# Patient Record
Sex: MALE | Race: WHITE | Employment: OTHER | URBAN - METROPOLITAN AREA
[De-identification: names, ages, dates, MRNs, and addresses within clinical notes are randomized per-mention and may not be internally consistent; named-entity substitution may affect disease eponyms.]

---

## 2019-05-11 ENCOUNTER — APPOINTMENT (OUTPATIENT)
Dept: CT IMAGING | Age: 49
End: 2019-05-11
Payer: MEDICARE

## 2019-05-11 ENCOUNTER — HOSPITAL ENCOUNTER (EMERGENCY)
Age: 49
Discharge: NURSING, REHAB OR LONG-TERM CARE | End: 2019-05-11
Attending: EMERGENCY MEDICINE
Payer: MEDICARE

## 2019-05-11 VITALS
WEIGHT: 212 LBS | HEIGHT: 71 IN | DIASTOLIC BLOOD PRESSURE: 79 MMHG | SYSTOLIC BLOOD PRESSURE: 140 MMHG | TEMPERATURE: 98.1 F | OXYGEN SATURATION: 98 % | HEART RATE: 68 BPM | RESPIRATION RATE: 18 BRPM | BODY MASS INDEX: 29.68 KG/M2

## 2019-05-11 DIAGNOSIS — S01.81XA FACIAL LACERATION, INITIAL ENCOUNTER: ICD-10-CM
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
## 2019-05-11 DIAGNOSIS — S09.90XA CLOSED HEAD INJURY, INITIAL ENCOUNTER: Primary | ICD-10-CM

## 2019-05-11 DIAGNOSIS — S01.01XA LACERATION OF SCALP, INITIAL ENCOUNTER: ICD-10-CM

## 2019-05-11 DIAGNOSIS — S02.2XXB OPEN FRACTURE OF NASAL BONE, INITIAL ENCOUNTER: ICD-10-CM

## 2019-05-11 PROCEDURE — 6360000002 HC RX W HCPCS: Performed by: NURSE PRACTITIONER

## 2019-05-11 PROCEDURE — 70486 CT MAXILLOFACIAL W/O DYE: CPT

## 2019-05-11 PROCEDURE — 90715 TDAP VACCINE 7 YRS/> IM: CPT | Performed by: NURSE PRACTITIONER

## 2019-05-11 PROCEDURE — 90471 IMMUNIZATION ADMIN: CPT | Performed by: NURSE PRACTITIONER

## 2019-05-11 PROCEDURE — 70450 CT HEAD/BRAIN W/O DYE: CPT

## 2019-05-11 PROCEDURE — 4500000025 HC ED LEVEL 5 PROCEDURE

## 2019-05-11 PROCEDURE — 72125 CT NECK SPINE W/O DYE: CPT

## 2019-05-11 PROCEDURE — 99285 EMERGENCY DEPT VISIT HI MDM: CPT

## 2019-05-11 RX ORDER — LIDOCAINE HYDROCHLORIDE 10 MG/ML
10 INJECTION, SOLUTION EPIDURAL; INFILTRATION; INTRACAUDAL; PERINEURAL ONCE
Status: DISCONTINUED | OUTPATIENT
Start: 2019-05-11 | End: 2019-05-11 | Stop reason: HOSPADM

## 2019-05-11 RX ORDER — CEPHALEXIN 500 MG/1
500 CAPSULE ORAL 4 TIMES DAILY
Qty: 40 CAPSULE | Refills: 0 | Status: SHIPPED | OUTPATIENT
Start: 2019-05-11 | End: 2019-05-21

## 2019-05-11 RX ADMIN — TETANUS TOXOID, REDUCED DIPHTHERIA TOXOID AND ACELLULAR PERTUSSIS VACCINE, ADSORBED 0.5 ML: 5; 2.5; 8; 8; 2.5 SUSPENSION INTRAMUSCULAR at 09:56

## 2019-05-11 ASSESSMENT — PAIN DESCRIPTION - DESCRIPTORS: DESCRIPTORS: HEADACHE;THROBBING

## 2019-05-11 ASSESSMENT — PAIN DESCRIPTION - PAIN TYPE: TYPE: ACUTE PAIN

## 2019-05-11 ASSESSMENT — PAIN DESCRIPTION - LOCATION: LOCATION: HEAD

## 2019-05-11 NOTE — ED PROVIDER NOTES
ED Attending Attestation Note     Date of evaluation: 5/11/2019    This patient was seen by the advance practice provider. I have seen and examined the patient, agree with the workup, evaluation, management and diagnosis. The care plan has been discussed. My assessment reveals elderly male who had a mechanical fall forwards while transferring, witnessed. Hit head/face. Exam reveals lac to the bridge of the nose/forehead. Following commands, no focal deficits. Will scan head, face, neck    I was present for key portions of the lac repair.      Dianne Morel III, MD  05/11/19 6086

## 2019-05-11 NOTE — ED PROVIDER NOTES
1 [facility]  EMERGENCY DEPARTMENT ENCOUNTER          NURSE PRACTITIONER NOTE       Date of evaluation: 5/11/2019    Chief Complaint     Fall; Head Injury; and Laceration      History of Present Illness     Kyra Munoz is a 46 y.o. male with a past medical history of anemia, bipolar, dementia, hyperlipidemia, peripheral vascular disease, schizophrenia, seizure, traumatic brain injury presents emergency room for fall. EMS states the patient currently resides at a nursing home. The assistant attempted to get the patient out of bed to ambulate which was a normal day. States the patient had a mechanical fall and the assistant was unable to catch him. Patient had face planted. They had denied loss of consciousness. Patient has a laceration across the bridge of his nose, his midforehead, and 1 laceration noted to the occiput. States he is pleasantly confused at baseline. Patient has some tremors which are normal for him as well. He currently is not on any anticoagulants. Unsure of patient's last tetanus status. There is nothing documented in Epic. Review of Systems     As noted above, otherwise negative. Past Medical, Surgical, Family, and Social History     He has a past medical history of Alternating exotropia, Anemia, Aphasia, Bipolar affective ([address] Dementia in conditions classified elsewhere with behavioral disturbance, Functional urinary incontinence, Hyperlipidemia, Mood disorder ([address] Peripheral vascular disease ([address] Schizophrenia ([address] Seizures ([address] and TBI (traumatic brain injury) ([address] He has a past surgical history that includes fracture surgery and brain surgery. His family history is not on file. He has an unknown smoking status. He has never used smokeless tobacco. He reports that he does not drink alcohol or use drugs. Medications     Previous Medications    ACETAMINOPHEN (TYLENOL) 500 MG TABLET    Take 500 mg by mouth every 4 hours as needed for Pain. DESMOPRESSIN (DDAVP) 0.1 MG TABLET    Take 0.1 mg by mouth 2 times daily    DEXTROMETHORPHAN-GUAIFENESIN (MUCINEX DM)  MG PER SR TABLET    Take 1 tablet by mouth daily as needed for Cough or Congestion. DIVALPROEX SODIUM (DEPAKOTE SPRINKLES PO)    Take 1,250 mg by mouth 2 times daily. LACTASE (LACTAID) 9000 UNITS CHEW CHEWABLE TABLET    Take 9,000 Units by mouth 3 times daily (with meals)    LACTULOSE (CHRONULAC) 10 GM/15ML SOLUTION    Take 30 mLs by mouth 4 times daily     LOPERAMIDE (IMODIUM) 2 MG CAPSULE    Take 2 mg by mouth 4 times daily as needed for Diarrhea. LORATADINE (CLARITIN) 10 MG TABLET    Take 10 mg by mouth daily as needed (Cough, Congestion). LORAZEPAM (ATIVAN) 0.5 MG TABLET    Take 0.5 mg by mouth nightly    LORAZEPAM (ATIVAN) 1 MG TABLET    Take 1 tablet by mouth every 6 hours as needed (agitation)    MAGIC BUTT CREAM    Apply topically as needed to buttocks. Contains lidocaine, nystatin and zinc.    MAGNESIUM HYDROXIDE (MILK OF MAGNESIA) 400 MG/5ML SUSPENSION    Take 30 mLs by mouth daily as needed for Constipation. OLOPATADINE (PATANOL) 0.1 % OPHTHALMIC SOLUTION    Place 1 drop into both eyes 2 times daily    ONDANSETRON (ZOFRAN) 4 MG TABLET    Take 4 mg by mouth 3 times daily as needed for Nausea or Vomiting. Give 30 min before meals for nausea/vomiting    PHENYTOIN (DILANTIN) 125 MG/5ML SUSPENSION    Take 250 mg by mouth daily    POLYCARBOPHIL (FIBERCON) 625 MG TABLET    Take 625 mg by mouth 2 times daily     POLYETHYLENE GLYCOL (MIRALAX) PACKET    Take 17 g by mouth three times a week. Monday - Wednesday - Friday    PROMETHAZINE (PHENERGAN) 25 MG/ML INJECTION    Inject 25 mg into the muscle every 6 hours as needed (Nausea and Vomiting). QUETIAPINE (SEROQUEL) 50 MG TABLET    Take 50 mg by mouth 2 times daily    RANITIDINE (ZANTAC 75) 75 MG TABLET    Take 75 mg by mouth daily     SORBITOL 70 % SOLUTION    Take 30 mLs by mouth daily.        Allergies     He is allergic with unchanged ventriculomegaly. 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 LABS:   No results found for this visit on 05/11/19. ED BEDSIDE ULTRASOUND:  None    RECENT VITALS:  BP: (!) 161/98, Temp: 98.1 °F (36.7 °C),Pulse: 76, Resp: 20, SpO2: 99 %     Procedures     Lac Repair  Date/Time: 5/11/2019 10:36 AM  Performed by: Vicki Bumpers, APRN - CNP  Authorized by: Santiago Ba MD     Consent:     Consent obtained:  Verbal    Consent given by:  Healthcare agent  Anesthesia (see MAR for exact dosages): Anesthesia method:  Local infiltration    Local anesthetic:  Lidocaine 1% w/o epi  Laceration details:     Location:  Scalp    Scalp location:  Occipital    Length (cm):  1.5  Repair type:     Repair type:  Simple  Pre-procedure details:     Preparation:  Patient was prepped and draped in usual sterile fashion  Exploration:     Wound exploration: wound explored through full range of motion      Wound extent: no areolar tissue violation noted, no fascia violation noted, no foreign bodies/material noted, no muscle damage noted, no nerve damage noted, no tendon damage noted, no underlying fracture noted and no vascular damage noted      Contaminated: no    Treatment:     Area cleansed with:  Saline    Amount of cleaning:  Standard    Irrigation solution:  Sterile saline    Irrigation method:  Syringe    Visualized foreign bodies/material removed: no    Skin repair:     Repair method:  Staples    Number of staples:  2  Approximation:     Approximation:  Close    Vermilion border: well-aligned    Post-procedure details:     Dressing:  Antibiotic ointment    Patient tolerance of procedure: Tolerated well, no immediate complications  Lac Repair  Date/Time: 5/11/2019 10:38 AM  Performed by: Vicki Bumpers, APRN - CNP  Authorized by: Santiago Ba MD     Consent:     Consent obtained:  Verbal    Consent given by:  Healthcare agent  Anesthesia (see MAR for exact dosages):      Anesthesia method: Local infiltration    Local anesthetic:  Lidocaine 1% w/o epi  Laceration details:     Location:  Face    Face location:  Forehead    Length (cm):  1.5  Repair type:     Repair type:  Simple  Pre-procedure details:     Preparation:  Patient was prepped and draped in usual sterile fashion  Exploration:     Wound extent: no areolar tissue violation noted, no fascia violation noted, no foreign bodies/material noted, no muscle damage noted, no nerve damage noted, no tendon damage noted, no underlying fracture noted and no vascular damage noted      Contaminated: no    Treatment:     Area cleansed with:  Saline    Amount of cleaning:  Standard    Irrigation solution:  Sterile saline    Irrigation method:  Syringe  Skin repair:     Repair method:  Sutures    Suture size:  6-0    Number of sutures:  4  Approximation:     Approximation:  Close    Vermilion border: well-aligned    Post-procedure details:     Dressing:  Antibiotic ointment    Patient tolerance of procedure: Tolerated well, no immediate complications  Lac Repair  Date/Time: 5/11/2019 10:39 AM  Performed by: PROMISE Gaines - CNP  Authorized by: Sonya Barillas MD     Consent:     Consent obtained:  Verbal    Consent given by:  Healthcare agent  Anesthesia (see MAR for exact dosages):      Anesthesia method:  Local infiltration    Local anesthetic:  Lidocaine 1% w/o epi  Laceration details:     Location:  Face    Face location:  Nose    Length (cm):  2  Repair type:     Repair type:  Simple  Pre-procedure details:     Preparation:  Patient was prepped and draped in usual sterile fashion  Exploration:     Wound extent: no areolar tissue violation noted, no fascia violation noted, no foreign bodies/material noted, no muscle damage noted, no nerve damage noted, no tendon damage noted, no underlying fracture noted and no vascular damage noted      Contaminated: no    Treatment:     Area cleansed with:  Saline    Amount of cleaning:  Standard    Irrigation solution:  Sterile saline    Irrigation method:  Syringe    Visualized foreign bodies/material removed: no    Skin repair:     Repair method:  Sutures    Suture size:  6-0    Number of sutures:  7  Approximation:     Approximation:  Close    Vermilion border: well-aligned    Post-procedure details:     Dressing:  Antibiotic ointment    Patient tolerance of procedure: Tolerated well, no immediate complications            ED Course     Nursing Notes, Past MedicalHx, Past Surgical Hx, Social Hx, Allergies, and Family Hx were reviewed. The patient was given the following medications:  Orders Placed This Encounter   Medications    lidocaine PF 1 % injection 10 mL    Tetanus-Diphth-Acell Pertussis (BOOSTRIX) injection 0.5 mL    cephALEXin (KEFLEX) 500 MG capsule     Sig: Take 1 capsule by mouth 4 times daily for 10 days     Dispense:  40 capsule     Refill:  0            CONSULTS:  None    MEDICAL DECISION MAKING / ASSESSMENT / Cyrus Marcos is a 46 y.o. male presents emergency room from the nursing home for mechanical fall. Patient has a history of TBI and pleasantly confused baseline. He normally walks with assistance. Today while getting out of bed, patient had stumbled and the nurse assistant was unable to catch him. The patient had fallen 4 striking his face and somehow also obtained lacerations back his head. There is no reported loss of consciousness. On exam patient is alert and pleasantly confused. Appears in no acute distress. He does not take any anticoagulants. Unsure of last tetanus status. Patient did have lacerations noted to the midforehead, bridge of nose, and along the occiput. The wounds were anesthetized, cleaned, and repaired. Tetanus status was updated. Patient is not on anticoagulants. Head CT and cervical spine CT negative per radiology. Facial bone CT revealed comminuted nasal bone fracture. Patient was placed on Keflex.   Instructions for the nursing home is to

## 2019-05-11 NOTE — ED NOTES
Report called to Malcolm Ji receiving RN for DR KRUNAL LANDRUM [facility]     [facility]  05/11/19 0245

## 2019-05-11 NOTE — ED TRIAGE NOTES
Sam Scott at [facility]. Was being assisted to chair, fell forward. Has a large lac to bridge of his nose, forehead and posterior head. Bleeding controlled.   No LOC, No blood thinners

## 2019-05-11 NOTE — ED NOTES
Bed: A02-02  Expected date:   Expected time:   Means of arrival:   Comments:  Lesli Mendez, [address] [facility]  05/11/19 8961

## 2019-05-20 ENCOUNTER — OFFICE VISIT (OUTPATIENT)
Dept: ENT CLINIC | Age: 49
End: 2019-05-20
Payer: MEDICARE

## 2019-05-20 VITALS
HEIGHT: 71 IN | DIASTOLIC BLOOD PRESSURE: 85 MMHG | BODY MASS INDEX: 29.57 KG/M2 | HEART RATE: 77 BPM | TEMPERATURE: 98 F | SYSTOLIC BLOOD PRESSURE: 149 MMHG

## 2019-05-20 DIAGNOSIS — S02.2XXA CLOSED FRACTURE OF NASAL BONE, INITIAL ENCOUNTER: ICD-10-CM

## 2019-05-20 DIAGNOSIS — S01.81XA FACIAL LACERATION, INITIAL ENCOUNTER: Primary | ICD-10-CM

## 2019-05-20 PROCEDURE — G8419 CALC BMI OUT NRM PARAM NOF/U: HCPCS | Performed by: OTOLARYNGOLOGY

## 2019-05-20 PROCEDURE — 99203 OFFICE O/P NEW LOW 30 MIN: CPT | Performed by: OTOLARYNGOLOGY

## 2019-05-20 PROCEDURE — 1036F TOBACCO NON-USER: CPT | Performed by: OTOLARYNGOLOGY

## 2019-05-20 PROCEDURE — G8427 DOCREV CUR MEDS BY ELIG CLIN: HCPCS | Performed by: OTOLARYNGOLOGY

## 2019-05-20 PROCEDURE — 3017F COLORECTAL CA SCREEN DOC REV: CPT | Performed by: OTOLARYNGOLOGY

## 2019-05-20 ASSESSMENT — ENCOUNTER SYMPTOMS
VOICE CHANGE: 0
STRIDOR: 0
CONSTIPATION: 0
COUGH: 0
VOMITING: 0
BLOOD IN STOOL: 0
NAUSEA: 0
PHOTOPHOBIA: 0
TROUBLE SWALLOWING: 0
WHEEZING: 0
CHOKING: 0
FACIAL SWELLING: 0
SINUS PAIN: 0
RHINORRHEA: 0
SHORTNESS OF BREATH: 0
SORE THROAT: 0
DIARRHEA: 0
SINUS PRESSURE: 0
EYE ITCHING: 0
EYE DISCHARGE: 0
BACK PAIN: 0
COLOR CHANGE: 0
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
## 2020-01-01 ENCOUNTER — APPOINTMENT (OUTPATIENT)
Dept: GENERAL RADIOLOGY | Age: 50
DRG: 871 | End: 2020-01-01
Payer: MEDICARE

## 2020-01-01 ENCOUNTER — HOSPITAL ENCOUNTER (INPATIENT)
Age: 50
LOS: 10 days | DRG: 871 | End: 2020-10-31
Attending: EMERGENCY MEDICINE | Admitting: INTERNAL MEDICINE
Payer: MEDICARE

## 2020-01-01 VITALS
OXYGEN SATURATION: 51 % | HEART RATE: 130 BPM | WEIGHT: 198 LBS | RESPIRATION RATE: 24 BRPM | SYSTOLIC BLOOD PRESSURE: 133 MMHG | BODY MASS INDEX: 26.82 KG/M2 | HEIGHT: 72 IN | DIASTOLIC BLOOD PRESSURE: 74 MMHG | TEMPERATURE: 101.5 F

## 2020-01-01 LAB
ALBUMIN SERPL-MCNC: 2.4 G/DL (ref 3.4–5)
ALBUMIN SERPL-MCNC: 2.5 G/DL (ref 3.4–5)
ALBUMIN SERPL-MCNC: 2.7 G/DL (ref 3.4–5)
ALP BLD-CCNC: 85 U/L (ref 40–129)
ALP BLD-CCNC: 90 U/L (ref 40–129)
ALP BLD-CCNC: 97 U/L (ref 40–129)
ALT SERPL-CCNC: 46 U/L (ref 10–40)
ALT SERPL-CCNC: 57 U/L (ref 10–40)
ALT SERPL-CCNC: 93 U/L (ref 10–40)
AMMONIA: 53 UMOL/L (ref 16–60)
AMORPHOUS: ABNORMAL /HPF
ANION GAP SERPL CALCULATED.3IONS-SCNC: 10 MMOL/L (ref 3–16)
ANION GAP SERPL CALCULATED.3IONS-SCNC: 11 MMOL/L (ref 3–16)
ANION GAP SERPL CALCULATED.3IONS-SCNC: 11 MMOL/L (ref 3–16)
ANION GAP SERPL CALCULATED.3IONS-SCNC: 12 MMOL/L (ref 3–16)
ANION GAP SERPL CALCULATED.3IONS-SCNC: 14 MMOL/L (ref 3–16)
ANION GAP SERPL CALCULATED.3IONS-SCNC: 17 MMOL/L (ref 3–16)
ANION GAP SERPL CALCULATED.3IONS-SCNC: 8 MMOL/L (ref 3–16)
ANION GAP SERPL CALCULATED.3IONS-SCNC: 9 MMOL/L (ref 3–16)
ANION GAP SERPL CALCULATED.3IONS-SCNC: 9 MMOL/L (ref 3–16)
ANISOCYTOSIS: ABNORMAL
AST SERPL-CCNC: 219 U/L (ref 15–37)
AST SERPL-CCNC: 59 U/L (ref 15–37)
AST SERPL-CCNC: 93 U/L (ref 15–37)
BACTERIA: ABNORMAL /HPF
BANDED NEUTROPHILS RELATIVE PERCENT: 13 % (ref 0–7)
BANDED NEUTROPHILS RELATIVE PERCENT: 15 % (ref 0–7)
BANDED NEUTROPHILS RELATIVE PERCENT: 6 % (ref 0–7)
BASE EXCESS VENOUS: 2.2 MMOL/L (ref -2–3)
BASOPHILS ABSOLUTE: 0 K/UL (ref 0–0.2)
BASOPHILS RELATIVE PERCENT: 0 %
BILIRUB SERPL-MCNC: 0.3 MG/DL (ref 0–1)
BILIRUB SERPL-MCNC: 0.3 MG/DL (ref 0–1)
BILIRUB SERPL-MCNC: <0.2 MG/DL (ref 0–1)
BILIRUBIN DIRECT: <0.2 MG/DL (ref 0–0.3)
BILIRUBIN URINE: ABNORMAL
BILIRUBIN, INDIRECT: ABNORMAL MG/DL (ref 0–1)
BLOOD CULTURE, ROUTINE: NORMAL
BLOOD, URINE: ABNORMAL
BUN BLDV-MCNC: 18 MG/DL (ref 7–20)
BUN BLDV-MCNC: 19 MG/DL (ref 7–20)
BUN BLDV-MCNC: 20 MG/DL (ref 7–20)
BUN BLDV-MCNC: 20 MG/DL (ref 7–20)
BUN BLDV-MCNC: 22 MG/DL (ref 7–20)
BUN BLDV-MCNC: 28 MG/DL (ref 7–20)
BUN BLDV-MCNC: 30 MG/DL (ref 7–20)
BUN BLDV-MCNC: 32 MG/DL (ref 7–20)
BUN BLDV-MCNC: 33 MG/DL (ref 7–20)
BUN BLDV-MCNC: 39 MG/DL (ref 7–20)
BUN BLDV-MCNC: 68 MG/DL (ref 7–20)
C DIFF TOXIN/ANTIGEN: NORMAL
C-REACTIVE PROTEIN: 248.1 MG/L (ref 0–5.1)
C-REACTIVE PROTEIN: 480 MG/L (ref 0–5.1)
C. DIFFICILE TOXIN MOLECULAR: NORMAL
CALCIUM SERPL-MCNC: 7.7 MG/DL (ref 8.3–10.6)
CALCIUM SERPL-MCNC: 7.8 MG/DL (ref 8.3–10.6)
CALCIUM SERPL-MCNC: 7.8 MG/DL (ref 8.3–10.6)
CALCIUM SERPL-MCNC: 8.2 MG/DL (ref 8.3–10.6)
CALCIUM SERPL-MCNC: 8.3 MG/DL (ref 8.3–10.6)
CALCIUM SERPL-MCNC: 8.3 MG/DL (ref 8.3–10.6)
CALCIUM SERPL-MCNC: 8.4 MG/DL (ref 8.3–10.6)
CALCIUM SERPL-MCNC: 8.5 MG/DL (ref 8.3–10.6)
CALCIUM SERPL-MCNC: 8.5 MG/DL (ref 8.3–10.6)
CARBOXYHEMOGLOBIN: 1.8 % (ref 0–1.5)
CHLORIDE BLD-SCNC: 107 MMOL/L (ref 99–110)
CHLORIDE BLD-SCNC: 108 MMOL/L (ref 99–110)
CHLORIDE BLD-SCNC: 108 MMOL/L (ref 99–110)
CHLORIDE BLD-SCNC: 110 MMOL/L (ref 99–110)
CHLORIDE BLD-SCNC: 110 MMOL/L (ref 99–110)
CHLORIDE BLD-SCNC: 111 MMOL/L (ref 99–110)
CHLORIDE BLD-SCNC: 114 MMOL/L (ref 99–110)
CHLORIDE BLD-SCNC: 114 MMOL/L (ref 99–110)
CHLORIDE BLD-SCNC: 116 MMOL/L (ref 99–110)
CHLORIDE BLD-SCNC: 117 MMOL/L (ref 99–110)
CHLORIDE BLD-SCNC: 99 MMOL/L (ref 99–110)
CLARITY: CLEAR
CO2: 22 MMOL/L (ref 21–32)
CO2: 23 MMOL/L (ref 21–32)
CO2: 24 MMOL/L (ref 21–32)
CO2: 25 MMOL/L (ref 21–32)
CO2: 26 MMOL/L (ref 21–32)
CO2: 27 MMOL/L (ref 21–32)
COLOR: YELLOW
CREAT SERPL-MCNC: 0.9 MG/DL (ref 0.9–1.3)
CREAT SERPL-MCNC: 1 MG/DL (ref 0.9–1.3)
CREAT SERPL-MCNC: 1 MG/DL (ref 0.9–1.3)
CREAT SERPL-MCNC: 1.1 MG/DL (ref 0.9–1.3)
CREAT SERPL-MCNC: 1.1 MG/DL (ref 0.9–1.3)
CREAT SERPL-MCNC: 1.6 MG/DL (ref 0.9–1.3)
CREAT SERPL-MCNC: 1.8 MG/DL (ref 0.9–1.3)
CREAT SERPL-MCNC: 2.8 MG/DL (ref 0.9–1.3)
CULTURE, BLOOD 2: NORMAL
D DIMER: 453 NG/ML DDU (ref 0–229)
D DIMER: 746 NG/ML DDU (ref 0–229)
D DIMER: 930 NG/ML DDU (ref 0–229)
D DIMER: 968 NG/ML DDU (ref 0–229)
EKG ATRIAL RATE: 115 BPM
EKG DIAGNOSIS: NORMAL
EKG P AXIS: -12 DEGREES
EKG P-R INTERVAL: 112 MS
EKG Q-T INTERVAL: 450 MS
EKG QRS DURATION: 80 MS
EKG QTC CALCULATION (BAZETT): 622 MS
EKG R AXIS: 20 DEGREES
EKG T AXIS: 14 DEGREES
EKG VENTRICULAR RATE: 115 BPM
EOSINOPHILS ABSOLUTE: 0 K/UL (ref 0–0.6)
EOSINOPHILS ABSOLUTE: 0.2 K/UL (ref 0–0.6)
EOSINOPHILS RELATIVE PERCENT: 0 %
EOSINOPHILS RELATIVE PERCENT: 1 %
ESTIMATED AVERAGE GLUCOSE: 145.6 MG/DL
FERRITIN: 1688 NG/ML (ref 30–400)
FERRITIN: 3761 NG/ML (ref 30–400)
FERRITIN: 6802 NG/ML (ref 30–400)
GFR AFRICAN AMERICAN: 29
GFR AFRICAN AMERICAN: 48
GFR AFRICAN AMERICAN: 55
GFR AFRICAN AMERICAN: >60
GFR NON-AFRICAN AMERICAN: 24
GFR NON-AFRICAN AMERICAN: 40
GFR NON-AFRICAN AMERICAN: 46
GFR NON-AFRICAN AMERICAN: >60
GI BACTERIAL PATHOGENS BY PCR: NORMAL
GLUCOSE BLD-MCNC: 102 MG/DL (ref 70–99)
GLUCOSE BLD-MCNC: 106 MG/DL (ref 70–99)
GLUCOSE BLD-MCNC: 107 MG/DL (ref 70–99)
GLUCOSE BLD-MCNC: 108 MG/DL (ref 70–99)
GLUCOSE BLD-MCNC: 111 MG/DL (ref 70–99)
GLUCOSE BLD-MCNC: 114 MG/DL (ref 70–99)
GLUCOSE BLD-MCNC: 114 MG/DL (ref 70–99)
GLUCOSE BLD-MCNC: 116 MG/DL (ref 70–99)
GLUCOSE BLD-MCNC: 116 MG/DL (ref 70–99)
GLUCOSE BLD-MCNC: 118 MG/DL (ref 70–99)
GLUCOSE BLD-MCNC: 118 MG/DL (ref 70–99)
GLUCOSE BLD-MCNC: 119 MG/DL (ref 70–99)
GLUCOSE BLD-MCNC: 119 MG/DL (ref 70–99)
GLUCOSE BLD-MCNC: 120 MG/DL (ref 70–99)
GLUCOSE BLD-MCNC: 121 MG/DL (ref 70–99)
GLUCOSE BLD-MCNC: 122 MG/DL (ref 70–99)
GLUCOSE BLD-MCNC: 123 MG/DL (ref 70–99)
GLUCOSE BLD-MCNC: 123 MG/DL (ref 70–99)
GLUCOSE BLD-MCNC: 124 MG/DL (ref 70–99)
GLUCOSE BLD-MCNC: 125 MG/DL (ref 70–99)
GLUCOSE BLD-MCNC: 125 MG/DL (ref 70–99)
GLUCOSE BLD-MCNC: 126 MG/DL (ref 70–99)
GLUCOSE BLD-MCNC: 127 MG/DL (ref 70–99)
GLUCOSE BLD-MCNC: 128 MG/DL (ref 70–99)
GLUCOSE BLD-MCNC: 129 MG/DL (ref 70–99)
GLUCOSE BLD-MCNC: 130 MG/DL (ref 70–99)
GLUCOSE BLD-MCNC: 132 MG/DL (ref 70–99)
GLUCOSE BLD-MCNC: 133 MG/DL (ref 70–99)
GLUCOSE BLD-MCNC: 133 MG/DL (ref 70–99)
GLUCOSE BLD-MCNC: 135 MG/DL (ref 70–99)
GLUCOSE BLD-MCNC: 136 MG/DL (ref 70–99)
GLUCOSE BLD-MCNC: 138 MG/DL (ref 70–99)
GLUCOSE BLD-MCNC: 139 MG/DL (ref 70–99)
GLUCOSE BLD-MCNC: 140 MG/DL (ref 70–99)
GLUCOSE BLD-MCNC: 142 MG/DL (ref 70–99)
GLUCOSE BLD-MCNC: 142 MG/DL (ref 70–99)
GLUCOSE BLD-MCNC: 143 MG/DL (ref 70–99)
GLUCOSE BLD-MCNC: 145 MG/DL (ref 70–99)
GLUCOSE BLD-MCNC: 145 MG/DL (ref 70–99)
GLUCOSE BLD-MCNC: 150 MG/DL (ref 70–99)
GLUCOSE BLD-MCNC: 152 MG/DL (ref 70–99)
GLUCOSE BLD-MCNC: 152 MG/DL (ref 70–99)
GLUCOSE BLD-MCNC: 153 MG/DL (ref 70–99)
GLUCOSE BLD-MCNC: 154 MG/DL (ref 70–99)
GLUCOSE BLD-MCNC: 161 MG/DL (ref 70–99)
GLUCOSE BLD-MCNC: 161 MG/DL (ref 70–99)
GLUCOSE BLD-MCNC: 165 MG/DL (ref 70–99)
GLUCOSE BLD-MCNC: 165 MG/DL (ref 70–99)
GLUCOSE BLD-MCNC: 174 MG/DL (ref 70–99)
GLUCOSE BLD-MCNC: 218 MG/DL (ref 70–99)
GLUCOSE BLD-MCNC: 298 MG/DL (ref 70–99)
GLUCOSE BLD-MCNC: 89 MG/DL (ref 70–99)
GLUCOSE BLD-MCNC: 93 MG/DL (ref 70–99)
GLUCOSE BLD-MCNC: 94 MG/DL (ref 70–99)
GLUCOSE BLD-MCNC: 95 MG/DL (ref 70–99)
GLUCOSE BLD-MCNC: 96 MG/DL (ref 70–99)
GLUCOSE BLD-MCNC: 98 MG/DL (ref 70–99)
GLUCOSE URINE: NEGATIVE MG/DL
HBA1C MFR BLD: 6.7 %
HCO3 VENOUS: 27.3 MMOL/L (ref 24–28)
HCT VFR BLD CALC: 32.2 % (ref 40.5–52.5)
HCT VFR BLD CALC: 32.2 % (ref 40.5–52.5)
HCT VFR BLD CALC: 32.7 % (ref 40.5–52.5)
HCT VFR BLD CALC: 33 % (ref 40.5–52.5)
HCT VFR BLD CALC: 40 % (ref 40.5–52.5)
HEMOGLOBIN, VEN, REDUCED: 3.2 %
HEMOGLOBIN: 10.2 G/DL (ref 13.5–17.5)
HEMOGLOBIN: 10.2 G/DL (ref 13.5–17.5)
HEMOGLOBIN: 10.4 G/DL (ref 13.5–17.5)
HEMOGLOBIN: 10.6 G/DL (ref 13.5–17.5)
HEMOGLOBIN: 13.3 G/DL (ref 13.5–17.5)
KETONES, URINE: ABNORMAL MG/DL
L. PNEUMOPHILA SEROGP 1 UR AG: NORMAL
LACTIC ACID, SEPSIS: 1.6 MMOL/L (ref 0.4–1.9)
LACTIC ACID, SEPSIS: 2.6 MMOL/L (ref 0.4–1.9)
LACTIC ACID: 1 MMOL/L (ref 0.4–2)
LEUKOCYTE ESTERASE, URINE: NEGATIVE
LIPASE: 72 U/L (ref 13–60)
LYMPHOCYTES ABSOLUTE: 0.7 K/UL (ref 1–5.1)
LYMPHOCYTES ABSOLUTE: 1 K/UL (ref 1–5.1)
LYMPHOCYTES ABSOLUTE: 1.3 K/UL (ref 1–5.1)
LYMPHOCYTES ABSOLUTE: 1.6 K/UL (ref 1–5.1)
LYMPHOCYTES ABSOLUTE: 2.1 K/UL (ref 1–5.1)
LYMPHOCYTES RELATIVE PERCENT: 10 %
LYMPHOCYTES RELATIVE PERCENT: 15 %
LYMPHOCYTES RELATIVE PERCENT: 5 %
LYMPHOCYTES RELATIVE PERCENT: 7 %
LYMPHOCYTES RELATIVE PERCENT: 7 %
MCH RBC QN AUTO: 27.9 PG (ref 26–34)
MCH RBC QN AUTO: 28 PG (ref 26–34)
MCH RBC QN AUTO: 28.1 PG (ref 26–34)
MCH RBC QN AUTO: 28.2 PG (ref 26–34)
MCH RBC QN AUTO: 29 PG (ref 26–34)
MCHC RBC AUTO-ENTMCNC: 31.5 G/DL (ref 31–36)
MCHC RBC AUTO-ENTMCNC: 31.7 G/DL (ref 31–36)
MCHC RBC AUTO-ENTMCNC: 31.8 G/DL (ref 31–36)
MCHC RBC AUTO-ENTMCNC: 32.3 G/DL (ref 31–36)
MCHC RBC AUTO-ENTMCNC: 33.3 G/DL (ref 31–36)
MCV RBC AUTO: 87.2 FL (ref 80–100)
MCV RBC AUTO: 87.2 FL (ref 80–100)
MCV RBC AUTO: 88.2 FL (ref 80–100)
MCV RBC AUTO: 88.3 FL (ref 80–100)
MCV RBC AUTO: 88.4 FL (ref 80–100)
METAMYELOCYTES RELATIVE PERCENT: 1 %
METAMYELOCYTES RELATIVE PERCENT: 3 %
METAMYELOCYTES RELATIVE PERCENT: 5 %
METHEMOGLOBIN VENOUS: 0.7 % (ref 0–1.5)
MICROSCOPIC EXAMINATION: YES
MONOCYTES ABSOLUTE: 0.9 K/UL (ref 0–1.3)
MONOCYTES ABSOLUTE: 1.3 K/UL (ref 0–1.3)
MONOCYTES ABSOLUTE: 1.6 K/UL (ref 0–1.3)
MONOCYTES ABSOLUTE: 1.9 K/UL (ref 0–1.3)
MONOCYTES ABSOLUTE: 2.2 K/UL (ref 0–1.3)
MONOCYTES RELATIVE PERCENT: 10 %
MONOCYTES RELATIVE PERCENT: 10 %
MONOCYTES RELATIVE PERCENT: 12 %
MONOCYTES RELATIVE PERCENT: 13 %
MONOCYTES RELATIVE PERCENT: 6 %
MYELOCYTE PERCENT: 2 %
MYELOCYTE PERCENT: 2 %
NEUTROPHILS ABSOLUTE: 11.2 K/UL (ref 1.7–7.7)
NEUTROPHILS ABSOLUTE: 12.4 K/UL (ref 1.7–7.7)
NEUTROPHILS ABSOLUTE: 14.6 K/UL (ref 1.7–7.7)
NEUTROPHILS ABSOLUTE: 16.2 K/UL (ref 1.7–7.7)
NEUTROPHILS ABSOLUTE: 8.2 K/UL (ref 1.7–7.7)
NEUTROPHILS RELATIVE PERCENT: 62 %
NEUTROPHILS RELATIVE PERCENT: 70 %
NEUTROPHILS RELATIVE PERCENT: 72 %
NEUTROPHILS RELATIVE PERCENT: 74 %
NEUTROPHILS RELATIVE PERCENT: 79 %
NITRITE, URINE: NEGATIVE
NUCLEATED RED BLOOD CELLS: 1 /100 WBC
NUCLEATED RED BLOOD CELLS: 1 /100 WBC
O2 SAT, VEN: 97 %
PCO2, VEN: 46.7 MMHG (ref 41–51)
PDW BLD-RTO: 17 % (ref 12.4–15.4)
PDW BLD-RTO: 18.2 % (ref 12.4–15.4)
PDW BLD-RTO: 18.7 % (ref 12.4–15.4)
PERFORMED ON: ABNORMAL
PERFORMED ON: NORMAL
PH UA: 6 (ref 5–8)
PH VENOUS: 7.38 (ref 7.35–7.45)
PLATELET # BLD: 249 K/UL (ref 135–450)
PLATELET # BLD: 392 K/UL (ref 135–450)
PLATELET # BLD: 395 K/UL (ref 135–450)
PLATELET # BLD: 432 K/UL (ref 135–450)
PLATELET # BLD: 448 K/UL (ref 135–450)
PMV BLD AUTO: 8.7 FL (ref 5–10.5)
PMV BLD AUTO: 8.8 FL (ref 5–10.5)
PMV BLD AUTO: 9.2 FL (ref 5–10.5)
PMV BLD AUTO: 9.3 FL (ref 5–10.5)
PMV BLD AUTO: 9.9 FL (ref 5–10.5)
PO2, VEN: 86.1 MMHG (ref 25–40)
POTASSIUM REFLEX MAGNESIUM: 4.3 MMOL/L (ref 3.5–5.1)
POTASSIUM SERPL-SCNC: 2.6 MMOL/L (ref 3.5–5.1)
POTASSIUM SERPL-SCNC: 2.8 MMOL/L (ref 3.5–5.1)
POTASSIUM SERPL-SCNC: 3 MMOL/L (ref 3.5–5.1)
POTASSIUM SERPL-SCNC: 3.5 MMOL/L (ref 3.5–5.1)
POTASSIUM SERPL-SCNC: 3.6 MMOL/L (ref 3.5–5.1)
POTASSIUM SERPL-SCNC: 3.6 MMOL/L (ref 3.5–5.1)
POTASSIUM SERPL-SCNC: 3.7 MMOL/L (ref 3.5–5.1)
POTASSIUM SERPL-SCNC: 3.7 MMOL/L (ref 3.5–5.1)
POTASSIUM SERPL-SCNC: 3.8 MMOL/L (ref 3.5–5.1)
POTASSIUM SERPL-SCNC: 3.9 MMOL/L (ref 3.5–5.1)
POTASSIUM SERPL-SCNC: 4.6 MMOL/L (ref 3.5–5.1)
PROCALCITONIN: 1.2 NG/ML (ref 0–0.15)
PROTEIN UA: 100 MG/DL
RBC # BLD: 3.64 M/UL (ref 4.2–5.9)
RBC # BLD: 3.65 M/UL (ref 4.2–5.9)
RBC # BLD: 3.73 M/UL (ref 4.2–5.9)
RBC # BLD: 3.75 M/UL (ref 4.2–5.9)
RBC # BLD: 4.59 M/UL (ref 4.2–5.9)
RBC # BLD: NORMAL 10*6/UL
RBC UA: ABNORMAL /HPF (ref 0–4)
SARS-COV-2, PCR: DETECTED
SEDIMENTATION RATE, ERYTHROCYTE: 110 MM/HR (ref 0–20)
SODIUM BLD-SCNC: 139 MMOL/L (ref 136–145)
SODIUM BLD-SCNC: 143 MMOL/L (ref 136–145)
SODIUM BLD-SCNC: 144 MMOL/L (ref 136–145)
SODIUM BLD-SCNC: 145 MMOL/L (ref 136–145)
SODIUM BLD-SCNC: 145 MMOL/L (ref 136–145)
SODIUM BLD-SCNC: 146 MMOL/L (ref 136–145)
SODIUM BLD-SCNC: 147 MMOL/L (ref 136–145)
SODIUM BLD-SCNC: 148 MMOL/L (ref 136–145)
SODIUM BLD-SCNC: 149 MMOL/L (ref 136–145)
SPECIFIC GRAVITY UA: 1.02 (ref 1–1.03)
STREP PNEUMONIAE ANTIGEN, URINE: NORMAL
TCO2 CALC VENOUS: 29 MMOL/L
TOTAL PROTEIN: 5.9 G/DL (ref 6.4–8.2)
TOTAL PROTEIN: 6.2 G/DL (ref 6.4–8.2)
TOTAL PROTEIN: 6.6 G/DL (ref 6.4–8.2)
URINE TYPE: ABNORMAL
UROBILINOGEN, URINE: 2 E.U./DL
VANCOMYCIN TROUGH: 17.2 UG/ML (ref 10–20)
VANCOMYCIN TROUGH: 26.2 UG/ML (ref 10–20)
WBC # BLD: 10.3 K/UL (ref 4–11)
WBC # BLD: 14.2 K/UL (ref 4–11)
WBC # BLD: 15.7 K/UL (ref 4–11)
WBC # BLD: 18 K/UL (ref 4–11)
WBC # BLD: 19.1 K/UL (ref 4–11)
WBC UA: ABNORMAL /HPF (ref 0–5)

## 2020-01-01 PROCEDURE — 87505 NFCT AGENT DETECTION GI: CPT

## 2020-01-01 PROCEDURE — 71045 X-RAY EXAM CHEST 1 VIEW: CPT

## 2020-01-01 PROCEDURE — 6360000002 HC RX W HCPCS: Performed by: INTERNAL MEDICINE

## 2020-01-01 PROCEDURE — 94761 N-INVAS EAR/PLS OXIMETRY MLT: CPT

## 2020-01-01 PROCEDURE — 92526 ORAL FUNCTION THERAPY: CPT

## 2020-01-01 PROCEDURE — 2500000003 HC RX 250 WO HCPCS: Performed by: INTERNAL MEDICINE

## 2020-01-01 PROCEDURE — 2700000000 HC OXYGEN THERAPY PER DAY

## 2020-01-01 PROCEDURE — 2580000003 HC RX 258: Performed by: INTERNAL MEDICINE

## 2020-01-01 PROCEDURE — 6370000000 HC RX 637 (ALT 250 FOR IP): Performed by: INTERNAL MEDICINE

## 2020-01-01 PROCEDURE — 93005 ELECTROCARDIOGRAM TRACING: CPT | Performed by: PHYSICIAN ASSISTANT

## 2020-01-01 PROCEDURE — C1751 CATH, INF, PER/CENT/MIDLINE: HCPCS

## 2020-01-01 PROCEDURE — 85652 RBC SED RATE AUTOMATED: CPT

## 2020-01-01 PROCEDURE — 94660 CPAP INITIATION&MGMT: CPT

## 2020-01-01 PROCEDURE — 99233 SBSQ HOSP IP/OBS HIGH 50: CPT | Performed by: INTERNAL MEDICINE

## 2020-01-01 PROCEDURE — 2060000000 HC ICU INTERMEDIATE R&B

## 2020-01-01 PROCEDURE — 85025 COMPLETE CBC W/AUTO DIFF WBC: CPT

## 2020-01-01 PROCEDURE — 80048 BASIC METABOLIC PNL TOTAL CA: CPT

## 2020-01-01 PROCEDURE — 83690 ASSAY OF LIPASE: CPT

## 2020-01-01 PROCEDURE — 83036 HEMOGLOBIN GLYCOSYLATED A1C: CPT

## 2020-01-01 PROCEDURE — 82803 BLOOD GASES ANY COMBINATION: CPT

## 2020-01-01 PROCEDURE — 99232 SBSQ HOSP IP/OBS MODERATE 35: CPT | Performed by: INTERNAL MEDICINE

## 2020-01-01 PROCEDURE — 99452 NTRPROF PH1/NTRNET/EHR RFRL: CPT | Performed by: NURSE PRACTITIONER

## 2020-01-01 PROCEDURE — 92610 EVALUATE SWALLOWING FUNCTION: CPT

## 2020-01-01 PROCEDURE — 31720 CLEARANCE OF AIRWAYS: CPT

## 2020-01-01 PROCEDURE — 80202 ASSAY OF VANCOMYCIN: CPT

## 2020-01-01 PROCEDURE — 86140 C-REACTIVE PROTEIN: CPT

## 2020-01-01 PROCEDURE — 96365 THER/PROPH/DIAG IV INF INIT: CPT

## 2020-01-01 PROCEDURE — 2580000003 HC RX 258: Performed by: PHYSICIAN ASSISTANT

## 2020-01-01 PROCEDURE — 87040 BLOOD CULTURE FOR BACTERIA: CPT

## 2020-01-01 PROCEDURE — 36415 COLL VENOUS BLD VENIPUNCTURE: CPT

## 2020-01-01 PROCEDURE — 85379 FIBRIN DEGRADATION QUANT: CPT

## 2020-01-01 PROCEDURE — 99223 1ST HOSP IP/OBS HIGH 75: CPT | Performed by: INTERNAL MEDICINE

## 2020-01-01 PROCEDURE — 87324 CLOSTRIDIUM AG IA: CPT

## 2020-01-01 PROCEDURE — 2580000003 HC RX 258

## 2020-01-01 PROCEDURE — 87493 C DIFF AMPLIFIED PROBE: CPT

## 2020-01-01 PROCEDURE — 02HV33Z INSERTION OF INFUSION DEVICE INTO SUPERIOR VENA CAVA, PERCUTANEOUS APPROACH: ICD-10-PCS | Performed by: INTERNAL MEDICINE

## 2020-01-01 PROCEDURE — 96367 TX/PROPH/DG ADDL SEQ IV INF: CPT

## 2020-01-01 PROCEDURE — 84145 PROCALCITONIN (PCT): CPT

## 2020-01-01 PROCEDURE — U0003 INFECTIOUS AGENT DETECTION BY NUCLEIC ACID (DNA OR RNA); SEVERE ACUTE RESPIRATORY SYNDROME CORONAVIRUS 2 (SARS-COV-2) (CORONAVIRUS DISEASE [COVID-19]), AMPLIFIED PROBE TECHNIQUE, MAKING USE OF HIGH THROUGHPUT TECHNOLOGIES AS DESCRIBED BY CMS-2020-01-R: HCPCS

## 2020-01-01 PROCEDURE — 82728 ASSAY OF FERRITIN: CPT

## 2020-01-01 PROCEDURE — 83605 ASSAY OF LACTIC ACID: CPT

## 2020-01-01 PROCEDURE — 99231 SBSQ HOSP IP/OBS SF/LOW 25: CPT | Performed by: NURSE PRACTITIONER

## 2020-01-01 PROCEDURE — 99285 EMERGENCY DEPT VISIT HI MDM: CPT

## 2020-01-01 PROCEDURE — 6360000002 HC RX W HCPCS: Performed by: PHYSICIAN ASSISTANT

## 2020-01-01 PROCEDURE — 81001 URINALYSIS AUTO W/SCOPE: CPT

## 2020-01-01 PROCEDURE — U0002 COVID-19 LAB TEST NON-CDC: HCPCS

## 2020-01-01 PROCEDURE — 87449 NOS EACH ORGANISM AG IA: CPT

## 2020-01-01 PROCEDURE — 80076 HEPATIC FUNCTION PANEL: CPT

## 2020-01-01 PROCEDURE — 36569 INSJ PICC 5 YR+ W/O IMAGING: CPT

## 2020-01-01 PROCEDURE — 6370000000 HC RX 637 (ALT 250 FOR IP): Performed by: HOSPITALIST

## 2020-01-01 PROCEDURE — 84132 ASSAY OF SERUM POTASSIUM: CPT

## 2020-01-01 PROCEDURE — 82140 ASSAY OF AMMONIA: CPT

## 2020-01-01 RX ORDER — FUROSEMIDE 10 MG/ML
40 INJECTION INTRAMUSCULAR; INTRAVENOUS ONCE
Status: COMPLETED | OUTPATIENT
Start: 2020-01-01 | End: 2020-01-01

## 2020-01-01 RX ORDER — DEXTROSE MONOHYDRATE 25 G/50ML
12.5 INJECTION, SOLUTION INTRAVENOUS PRN
Status: DISCONTINUED | OUTPATIENT
Start: 2020-01-01 | End: 2020-01-01 | Stop reason: HOSPADM

## 2020-01-01 RX ORDER — FUROSEMIDE 10 MG/ML
INJECTION INTRAMUSCULAR; INTRAVENOUS
Status: DISPENSED
Start: 2020-01-01 | End: 2020-01-01

## 2020-01-01 RX ORDER — PROMETHAZINE HYDROCHLORIDE 25 MG/1
12.5 TABLET ORAL EVERY 6 HOURS PRN
Status: DISCONTINUED | OUTPATIENT
Start: 2020-01-01 | End: 2020-01-01 | Stop reason: HOSPADM

## 2020-01-01 RX ORDER — ACETAMINOPHEN 650 MG/1
650 SUPPOSITORY RECTAL EVERY 6 HOURS PRN
Status: DISCONTINUED | OUTPATIENT
Start: 2020-01-01 | End: 2020-01-01 | Stop reason: HOSPADM

## 2020-01-01 RX ORDER — ACETAMINOPHEN 325 MG/1
650 TABLET ORAL EVERY 4 HOURS PRN
COMMUNITY

## 2020-01-01 RX ORDER — POTASSIUM CHLORIDE 7.45 MG/ML
10 INJECTION INTRAVENOUS
Status: DISCONTINUED | OUTPATIENT
Start: 2020-01-01 | End: 2020-01-01

## 2020-01-01 RX ORDER — INSULIN LISPRO 100 [IU]/ML
0-12 INJECTION, SOLUTION INTRAVENOUS; SUBCUTANEOUS EVERY 4 HOURS
Status: DISCONTINUED | OUTPATIENT
Start: 2020-01-01 | End: 2020-01-01 | Stop reason: HOSPADM

## 2020-01-01 RX ORDER — DEXAMETHASONE 2 MG/1
6 TABLET ORAL NIGHTLY
COMMUNITY

## 2020-01-01 RX ORDER — QUETIAPINE FUMARATE 25 MG/1
50 TABLET, FILM COATED ORAL 2 TIMES DAILY
Status: DISCONTINUED | OUTPATIENT
Start: 2020-01-01 | End: 2020-01-01

## 2020-01-01 RX ORDER — SODIUM CHLORIDE 450 MG/100ML
INJECTION, SOLUTION INTRAVENOUS CONTINUOUS
Status: DISCONTINUED | OUTPATIENT
Start: 2020-01-01 | End: 2020-01-01

## 2020-01-01 RX ORDER — CEFTRIAXONE 1 G/1
1 INJECTION, POWDER, FOR SOLUTION INTRAMUSCULAR; INTRAVENOUS EVERY 24 HOURS
COMMUNITY
Start: 2020-01-01 | End: 2020-01-01

## 2020-01-01 RX ORDER — SODIUM CHLORIDE 9 MG/ML
INJECTION, SOLUTION INTRAVENOUS CONTINUOUS
Status: DISCONTINUED | OUTPATIENT
Start: 2020-01-01 | End: 2020-01-01

## 2020-01-01 RX ORDER — ACETAMINOPHEN 325 MG/1
650 TABLET ORAL EVERY 6 HOURS PRN
Status: DISCONTINUED | OUTPATIENT
Start: 2020-01-01 | End: 2020-01-01 | Stop reason: HOSPADM

## 2020-01-01 RX ORDER — VALPROIC ACID 250 MG/5ML
1250 SOLUTION ORAL 2 TIMES DAILY
Status: DISCONTINUED | OUTPATIENT
Start: 2020-01-01 | End: 2020-01-01 | Stop reason: ALTCHOICE

## 2020-01-01 RX ORDER — ACETAMINOPHEN 650 MG
TABLET, EXTENDED RELEASE ORAL 2 TIMES DAILY
COMMUNITY

## 2020-01-01 RX ORDER — NICOTINE POLACRILEX 4 MG
15 LOZENGE BUCCAL PRN
Status: DISCONTINUED | OUTPATIENT
Start: 2020-01-01 | End: 2020-01-01 | Stop reason: SDUPTHER

## 2020-01-01 RX ORDER — LORAZEPAM 2 MG/ML
1 INJECTION INTRAMUSCULAR 2 TIMES DAILY
Status: DISCONTINUED | OUTPATIENT
Start: 2020-01-01 | End: 2020-01-01

## 2020-01-01 RX ORDER — PHENYTOIN 50 MG/1
200 TABLET, CHEWABLE ORAL 2 TIMES DAILY
Status: DISCONTINUED | OUTPATIENT
Start: 2020-01-01 | End: 2020-01-01 | Stop reason: ALTCHOICE

## 2020-01-01 RX ORDER — POTASSIUM CHLORIDE 600 MG/1
40 TABLET, FILM COATED, EXTENDED RELEASE ORAL DAILY
Status: DISCONTINUED | OUTPATIENT
Start: 2020-01-01 | End: 2020-01-01 | Stop reason: SDUPTHER

## 2020-01-01 RX ORDER — KETOROLAC TROMETHAMINE 30 MG/ML
30 INJECTION, SOLUTION INTRAMUSCULAR; INTRAVENOUS EVERY 6 HOURS PRN
Status: COMPLETED | OUTPATIENT
Start: 2020-01-01 | End: 2020-01-01

## 2020-01-01 RX ORDER — 0.9 % SODIUM CHLORIDE 0.9 %
30 INTRAVENOUS SOLUTION INTRAVENOUS ONCE
Status: COMPLETED | OUTPATIENT
Start: 2020-01-01 | End: 2020-01-01

## 2020-01-01 RX ORDER — POTASSIUM CHLORIDE 20 MEQ/1
20 TABLET, EXTENDED RELEASE ORAL ONCE
Status: COMPLETED | OUTPATIENT
Start: 2020-01-01 | End: 2020-01-01

## 2020-01-01 RX ORDER — VALPROIC ACID 250 MG/5ML
625 SOLUTION ORAL ONCE
Status: COMPLETED | OUTPATIENT
Start: 2020-01-01 | End: 2020-01-01

## 2020-01-01 RX ORDER — LORAZEPAM 1 MG/1
1 TABLET ORAL 2 TIMES DAILY
Status: DISCONTINUED | OUTPATIENT
Start: 2020-01-01 | End: 2020-01-01 | Stop reason: ALTCHOICE

## 2020-01-01 RX ORDER — DEXTROSE MONOHYDRATE 50 MG/ML
100 INJECTION, SOLUTION INTRAVENOUS PRN
Status: DISCONTINUED | OUTPATIENT
Start: 2020-01-01 | End: 2020-01-01 | Stop reason: HOSPADM

## 2020-01-01 RX ORDER — MORPHINE SULFATE 4 MG/ML
4 INJECTION, SOLUTION INTRAMUSCULAR; INTRAVENOUS
Status: DISCONTINUED | OUTPATIENT
Start: 2020-01-01 | End: 2020-01-01 | Stop reason: HOSPADM

## 2020-01-01 RX ORDER — POTASSIUM CHLORIDE 20 MEQ/1
40 TABLET, EXTENDED RELEASE ORAL DAILY
Status: DISPENSED | OUTPATIENT
Start: 2020-01-01 | End: 2020-01-01

## 2020-01-01 RX ORDER — DEXAMETHASONE SODIUM PHOSPHATE 4 MG/ML
6 INJECTION, SOLUTION INTRA-ARTICULAR; INTRALESIONAL; INTRAMUSCULAR; INTRAVENOUS; SOFT TISSUE EVERY 24 HOURS
Status: COMPLETED | OUTPATIENT
Start: 2020-01-01 | End: 2020-01-01

## 2020-01-01 RX ORDER — PHENYTOIN SODIUM 50 MG/ML
100 INJECTION, SOLUTION INTRAMUSCULAR; INTRAVENOUS EVERY 6 HOURS
Status: DISCONTINUED | OUTPATIENT
Start: 2020-01-01 | End: 2020-01-01

## 2020-01-01 RX ORDER — FAMOTIDINE 10 MG
20 TABLET ORAL DAILY
COMMUNITY

## 2020-01-01 RX ORDER — DEXTROSE MONOHYDRATE 50 MG/ML
INJECTION, SOLUTION INTRAVENOUS CONTINUOUS
Status: DISCONTINUED | OUTPATIENT
Start: 2020-01-01 | End: 2020-01-01

## 2020-01-01 RX ORDER — PHENYTOIN 50 MG/1
200 TABLET, CHEWABLE ORAL 2 TIMES DAILY
COMMUNITY

## 2020-01-01 RX ORDER — LORAZEPAM 2 MG/ML
0.5 INJECTION INTRAMUSCULAR 2 TIMES DAILY
Status: DISCONTINUED | OUTPATIENT
Start: 2020-01-01 | End: 2020-01-01

## 2020-01-01 RX ORDER — DEXTROSE MONOHYDRATE 25 G/50ML
12.5 INJECTION, SOLUTION INTRAVENOUS PRN
Status: DISCONTINUED | OUTPATIENT
Start: 2020-01-01 | End: 2020-01-01 | Stop reason: SDUPTHER

## 2020-01-01 RX ORDER — HYDROCHLOROTHIAZIDE 12.5 MG/1
12.5 TABLET ORAL DAILY
COMMUNITY

## 2020-01-01 RX ORDER — POTASSIUM CHLORIDE 600 MG/1
40 TABLET, FILM COATED, EXTENDED RELEASE ORAL DAILY
Status: DISPENSED | OUTPATIENT
Start: 2020-01-01 | End: 2020-01-01

## 2020-01-01 RX ORDER — SODIUM CHLORIDE 0.9 % (FLUSH) 0.9 %
10 SYRINGE (ML) INJECTION EVERY 12 HOURS SCHEDULED
Status: DISCONTINUED | OUTPATIENT
Start: 2020-01-01 | End: 2020-01-01 | Stop reason: HOSPADM

## 2020-01-01 RX ORDER — LABETALOL HYDROCHLORIDE 5 MG/ML
10 INJECTION, SOLUTION INTRAVENOUS EVERY 4 HOURS PRN
Status: DISCONTINUED | OUTPATIENT
Start: 2020-01-01 | End: 2020-01-01 | Stop reason: HOSPADM

## 2020-01-01 RX ORDER — PROCHLORPERAZINE EDISYLATE 5 MG/ML
10 INJECTION INTRAMUSCULAR; INTRAVENOUS EVERY 6 HOURS PRN
Status: DISCONTINUED | OUTPATIENT
Start: 2020-01-01 | End: 2020-01-01 | Stop reason: HOSPADM

## 2020-01-01 RX ORDER — LANOLIN ALCOHOL/MO/W.PET/CERES
6 CREAM (GRAM) TOPICAL NIGHTLY
COMMUNITY

## 2020-01-01 RX ORDER — VALPROIC ACID 250 MG/5ML
1250 SOLUTION ORAL 2 TIMES DAILY
COMMUNITY

## 2020-01-01 RX ORDER — GLYCOPYRROLATE 0.2 MG/ML
0.2 INJECTION INTRAMUSCULAR; INTRAVENOUS EVERY 4 HOURS PRN
Status: DISCONTINUED | OUTPATIENT
Start: 2020-01-01 | End: 2020-01-01 | Stop reason: HOSPADM

## 2020-01-01 RX ORDER — LORAZEPAM 2 MG/ML
1 INJECTION INTRAMUSCULAR 2 TIMES DAILY
Status: DISCONTINUED | OUTPATIENT
Start: 2020-01-01 | End: 2020-01-01 | Stop reason: ALTCHOICE

## 2020-01-01 RX ORDER — CASTOR OIL AND BALSAM, PERU 788; 87 MG/G; MG/G
OINTMENT TOPICAL 2 TIMES DAILY
Status: DISCONTINUED | OUTPATIENT
Start: 2020-01-01 | End: 2020-01-01 | Stop reason: HOSPADM

## 2020-01-01 RX ORDER — POLYETHYLENE GLYCOL 3350 17 G/17G
17 POWDER, FOR SOLUTION ORAL DAILY PRN
Status: DISCONTINUED | OUTPATIENT
Start: 2020-01-01 | End: 2020-01-01 | Stop reason: HOSPADM

## 2020-01-01 RX ORDER — SODIUM CHLORIDE 450 MG/100ML
INJECTION, SOLUTION INTRAVENOUS CONTINUOUS
Status: DISCONTINUED | OUTPATIENT
Start: 2020-01-01 | End: 2020-01-01 | Stop reason: HOSPADM

## 2020-01-01 RX ORDER — NIFEDIPINE 60 MG/1
60 TABLET, EXTENDED RELEASE ORAL DAILY
Status: DISCONTINUED | OUTPATIENT
Start: 2020-01-01 | End: 2020-01-01

## 2020-01-01 RX ORDER — DEXTROSE AND POTASSIUM CHLORIDE 5; .15 G/100ML; G/100ML
SOLUTION INTRAVENOUS CONTINUOUS
Status: DISCONTINUED | OUTPATIENT
Start: 2020-01-01 | End: 2020-01-01

## 2020-01-01 RX ORDER — POTASSIUM CHLORIDE 7.45 MG/ML
10 INJECTION INTRAVENOUS
Status: DISPENSED | OUTPATIENT
Start: 2020-01-01 | End: 2020-01-01

## 2020-01-01 RX ORDER — LORAZEPAM 1 MG/1
1 TABLET ORAL 2 TIMES DAILY
COMMUNITY

## 2020-01-01 RX ORDER — 0.9 % SODIUM CHLORIDE 0.9 %
30 INTRAVENOUS SOLUTION INTRAVENOUS PRN
Status: DISCONTINUED | OUTPATIENT
Start: 2020-01-01 | End: 2020-01-01 | Stop reason: HOSPADM

## 2020-01-01 RX ORDER — DEXTROSE MONOHYDRATE 50 MG/ML
100 INJECTION, SOLUTION INTRAVENOUS PRN
Status: DISCONTINUED | OUTPATIENT
Start: 2020-01-01 | End: 2020-01-01 | Stop reason: SDUPTHER

## 2020-01-01 RX ORDER — QUETIAPINE FUMARATE 100 MG/1
100 TABLET, FILM COATED ORAL 3 TIMES DAILY
COMMUNITY

## 2020-01-01 RX ORDER — AZITHROMYCIN 500 MG/1
500 TABLET, FILM COATED ORAL NIGHTLY
COMMUNITY
Start: 2020-01-01 | End: 2020-01-01

## 2020-01-01 RX ORDER — ONDANSETRON 2 MG/ML
4 INJECTION INTRAMUSCULAR; INTRAVENOUS EVERY 6 HOURS PRN
Status: DISCONTINUED | OUTPATIENT
Start: 2020-01-01 | End: 2020-01-01

## 2020-01-01 RX ORDER — FUROSEMIDE 10 MG/ML
60 INJECTION INTRAMUSCULAR; INTRAVENOUS ONCE
Status: COMPLETED | OUTPATIENT
Start: 2020-01-01 | End: 2020-01-01

## 2020-01-01 RX ORDER — HYDROCODONE BITARTRATE AND ACETAMINOPHEN 5; 325 MG/1; MG/1
1 TABLET ORAL EVERY 12 HOURS PRN
COMMUNITY

## 2020-01-01 RX ORDER — POTASSIUM CHLORIDE 7.45 MG/ML
10 INJECTION INTRAVENOUS ONCE
Status: COMPLETED | OUTPATIENT
Start: 2020-01-01 | End: 2020-01-01

## 2020-01-01 RX ORDER — METOPROLOL TARTRATE 5 MG/5ML
5 INJECTION INTRAVENOUS EVERY 6 HOURS
Status: DISCONTINUED | OUTPATIENT
Start: 2020-01-01 | End: 2020-01-01 | Stop reason: HOSPADM

## 2020-01-01 RX ORDER — MORPHINE SULFATE 2 MG/ML
2 INJECTION, SOLUTION INTRAMUSCULAR; INTRAVENOUS
Status: DISCONTINUED | OUTPATIENT
Start: 2020-01-01 | End: 2020-01-01 | Stop reason: HOSPADM

## 2020-01-01 RX ORDER — NIFEDIPINE 30 MG/1
30 TABLET, FILM COATED, EXTENDED RELEASE ORAL DAILY
Status: DISCONTINUED | OUTPATIENT
Start: 2020-01-01 | End: 2020-01-01

## 2020-01-01 RX ORDER — NICOTINE POLACRILEX 4 MG
15 LOZENGE BUCCAL PRN
Status: DISCONTINUED | OUTPATIENT
Start: 2020-01-01 | End: 2020-01-01 | Stop reason: HOSPADM

## 2020-01-01 RX ORDER — SODIUM CHLORIDE 0.9 % (FLUSH) 0.9 %
10 SYRINGE (ML) INJECTION PRN
Status: DISCONTINUED | OUTPATIENT
Start: 2020-01-01 | End: 2020-01-01 | Stop reason: HOSPADM

## 2020-01-01 RX ORDER — LORAZEPAM 2 MG/ML
0.5 INJECTION INTRAMUSCULAR 2 TIMES DAILY PRN
Status: DISCONTINUED | OUTPATIENT
Start: 2020-01-01 | End: 2020-01-01 | Stop reason: HOSPADM

## 2020-01-01 RX ORDER — VALPROIC ACID 250 MG/5ML
1250 SOLUTION ORAL 2 TIMES DAILY
Status: DISCONTINUED | OUTPATIENT
Start: 2020-01-01 | End: 2020-01-01

## 2020-01-01 RX ORDER — SODIUM CHLORIDE 9 MG/ML
INJECTION, SOLUTION INTRAVENOUS
Status: COMPLETED
Start: 2020-01-01 | End: 2020-01-01

## 2020-01-01 RX ADMIN — INSULIN LISPRO 2 UNITS: 100 INJECTION, SOLUTION INTRAVENOUS; SUBCUTANEOUS at 21:43

## 2020-01-01 RX ADMIN — PHENYTOIN SODIUM 100 MG: 50 INJECTION INTRAMUSCULAR; INTRAVENOUS at 05:07

## 2020-01-01 RX ADMIN — ENOXAPARIN SODIUM 30 MG: 30 INJECTION SUBCUTANEOUS at 21:56

## 2020-01-01 RX ADMIN — DEXAMETHASONE SODIUM PHOSPHATE 6 MG: 4 INJECTION, SOLUTION INTRAMUSCULAR; INTRAVENOUS at 09:19

## 2020-01-01 RX ADMIN — VALPROATE SODIUM 625 MG: 100 INJECTION, SOLUTION INTRAVENOUS at 21:10

## 2020-01-01 RX ADMIN — FOSPHENYTOIN SODIUM 100 MG PE: 50 INJECTION, SOLUTION INTRAMUSCULAR; INTRAVENOUS at 05:51

## 2020-01-01 RX ADMIN — Medication: at 22:07

## 2020-01-01 RX ADMIN — VALPROATE SODIUM 625 MG: 100 INJECTION, SOLUTION INTRAVENOUS at 06:11

## 2020-01-01 RX ADMIN — ACETAMINOPHEN 650 MG: 650 SUPPOSITORY RECTAL at 20:45

## 2020-01-01 RX ADMIN — FUROSEMIDE 40 MG: 10 INJECTION, SOLUTION INTRAMUSCULAR; INTRAVENOUS at 17:14

## 2020-01-01 RX ADMIN — ENOXAPARIN SODIUM 30 MG: 30 INJECTION SUBCUTANEOUS at 08:03

## 2020-01-01 RX ADMIN — VANCOMYCIN HYDROCHLORIDE 1250 MG: 10 INJECTION, POWDER, LYOPHILIZED, FOR SOLUTION INTRAVENOUS at 23:22

## 2020-01-01 RX ADMIN — LORAZEPAM 0.5 MG: 2 INJECTION INTRAMUSCULAR; INTRAVENOUS at 01:02

## 2020-01-01 RX ADMIN — INSULIN LISPRO 2 UNITS: 100 INJECTION, SOLUTION INTRAVENOUS; SUBCUTANEOUS at 03:44

## 2020-01-01 RX ADMIN — VANCOMYCIN HYDROCHLORIDE 1250 MG: 10 INJECTION, POWDER, LYOPHILIZED, FOR SOLUTION INTRAVENOUS at 17:53

## 2020-01-01 RX ADMIN — Medication 10 ML: at 08:17

## 2020-01-01 RX ADMIN — CEFEPIME HYDROCHLORIDE 2 G: 2 INJECTION, POWDER, FOR SOLUTION INTRAVENOUS at 05:16

## 2020-01-01 RX ADMIN — LORAZEPAM 1 MG: 1 TABLET ORAL at 21:42

## 2020-01-01 RX ADMIN — ENOXAPARIN SODIUM 30 MG: 30 INJECTION SUBCUTANEOUS at 09:18

## 2020-01-01 RX ADMIN — VALPROATE SODIUM 625 MG: 100 INJECTION, SOLUTION INTRAVENOUS at 23:56

## 2020-01-01 RX ADMIN — Medication 10 ML: at 08:11

## 2020-01-01 RX ADMIN — VANCOMYCIN HYDROCHLORIDE 1250 MG: 10 INJECTION, POWDER, LYOPHILIZED, FOR SOLUTION INTRAVENOUS at 18:42

## 2020-01-01 RX ADMIN — LORAZEPAM 1 MG: 2 INJECTION INTRAMUSCULAR; INTRAVENOUS at 23:26

## 2020-01-01 RX ADMIN — DEXTROSE MONOHYDRATE 625 MG: 50 INJECTION, SOLUTION INTRAVENOUS at 17:23

## 2020-01-01 RX ADMIN — Medication 10 ML: at 21:15

## 2020-01-01 RX ADMIN — CEFEPIME 2 G: 2 INJECTION, POWDER, FOR SOLUTION INTRAMUSCULAR; INTRAVENOUS at 06:23

## 2020-01-01 RX ADMIN — DEXAMETHASONE SODIUM PHOSPHATE 6 MG: 4 INJECTION, SOLUTION INTRAMUSCULAR; INTRAVENOUS at 09:17

## 2020-01-01 RX ADMIN — REMDESIVIR 100 MG: 100 INJECTION, POWDER, LYOPHILIZED, FOR SOLUTION INTRAVENOUS at 23:26

## 2020-01-01 RX ADMIN — LABETALOL HYDROCHLORIDE 10 MG: 5 INJECTION INTRAVENOUS at 21:57

## 2020-01-01 RX ADMIN — Medication 10 ML: at 14:13

## 2020-01-01 RX ADMIN — FUROSEMIDE 60 MG: 10 INJECTION, SOLUTION INTRAMUSCULAR; INTRAVENOUS at 21:51

## 2020-01-01 RX ADMIN — FOSPHENYTOIN SODIUM 100 MG PE: 50 INJECTION, SOLUTION INTRAMUSCULAR; INTRAVENOUS at 00:54

## 2020-01-01 RX ADMIN — LORAZEPAM 1 MG: 2 INJECTION INTRAMUSCULAR; INTRAVENOUS at 21:29

## 2020-01-01 RX ADMIN — Medication: at 22:10

## 2020-01-01 RX ADMIN — FOSPHENYTOIN SODIUM 100 MG PE: 50 INJECTION, SOLUTION INTRAMUSCULAR; INTRAVENOUS at 16:41

## 2020-01-01 RX ADMIN — POTASSIUM CHLORIDE 10 MEQ: 7.46 INJECTION, SOLUTION INTRAVENOUS at 08:02

## 2020-01-01 RX ADMIN — ACETAMINOPHEN 650 MG: 650 SUPPOSITORY RECTAL at 14:30

## 2020-01-01 RX ADMIN — VALPROATE SODIUM 625 MG: 100 INJECTION, SOLUTION INTRAVENOUS at 12:31

## 2020-01-01 RX ADMIN — LORAZEPAM 1 MG: 2 INJECTION INTRAMUSCULAR; INTRAVENOUS at 22:07

## 2020-01-01 RX ADMIN — DEXAMETHASONE SODIUM PHOSPHATE 6 MG: 4 INJECTION, SOLUTION INTRAMUSCULAR; INTRAVENOUS at 08:03

## 2020-01-01 RX ADMIN — POTASSIUM CHLORIDE 40 MEQ: 600 TABLET, FILM COATED, EXTENDED RELEASE ORAL at 09:58

## 2020-01-01 RX ADMIN — ENOXAPARIN SODIUM 30 MG: 30 INJECTION SUBCUTANEOUS at 09:20

## 2020-01-01 RX ADMIN — ENOXAPARIN SODIUM 30 MG: 30 INJECTION SUBCUTANEOUS at 08:57

## 2020-01-01 RX ADMIN — DEXTROSE MONOHYDRATE: 50 INJECTION, SOLUTION INTRAVENOUS at 09:44

## 2020-01-01 RX ADMIN — Medication: at 20:17

## 2020-01-01 RX ADMIN — DEXTROSE MONOHYDRATE 625 MG: 50 INJECTION, SOLUTION INTRAVENOUS at 17:12

## 2020-01-01 RX ADMIN — DEXTROSE MONOHYDRATE 625 MG: 50 INJECTION, SOLUTION INTRAVENOUS at 23:40

## 2020-01-01 RX ADMIN — LORAZEPAM 1 MG: 2 INJECTION INTRAMUSCULAR; INTRAVENOUS at 21:46

## 2020-01-01 RX ADMIN — Medication 10 ML: at 21:44

## 2020-01-01 RX ADMIN — VALPROIC ACID 625 MG: 250 SOLUTION ORAL at 10:11

## 2020-01-01 RX ADMIN — FOSPHENYTOIN SODIUM 100 MG PE: 50 INJECTION, SOLUTION INTRAMUSCULAR; INTRAVENOUS at 06:54

## 2020-01-01 RX ADMIN — DEXTROSE MONOHYDRATE 625 MG: 50 INJECTION, SOLUTION INTRAVENOUS at 08:16

## 2020-01-01 RX ADMIN — METOPROLOL TARTRATE 5 MG: 5 INJECTION INTRAVENOUS at 05:19

## 2020-01-01 RX ADMIN — LORAZEPAM 1 MG: 2 INJECTION INTRAMUSCULAR; INTRAVENOUS at 08:11

## 2020-01-01 RX ADMIN — INSULIN LISPRO 2 UNITS: 100 INJECTION, SOLUTION INTRAVENOUS; SUBCUTANEOUS at 20:06

## 2020-01-01 RX ADMIN — VANCOMYCIN HYDROCHLORIDE 1250 MG: 10 INJECTION, POWDER, LYOPHILIZED, FOR SOLUTION INTRAVENOUS at 06:29

## 2020-01-01 RX ADMIN — LORAZEPAM 1 MG: 2 INJECTION INTRAMUSCULAR; INTRAVENOUS at 08:06

## 2020-01-01 RX ADMIN — CEFEPIME 2 G: 2 INJECTION, POWDER, FOR SOLUTION INTRAMUSCULAR; INTRAVENOUS at 05:00

## 2020-01-01 RX ADMIN — FOSPHENYTOIN SODIUM 100 MG PE: 50 INJECTION, SOLUTION INTRAMUSCULAR; INTRAVENOUS at 12:59

## 2020-01-01 RX ADMIN — CEFEPIME 2 G: 2 INJECTION, POWDER, FOR SOLUTION INTRAMUSCULAR; INTRAVENOUS at 12:57

## 2020-01-01 RX ADMIN — FOSPHENYTOIN SODIUM 100 MG PE: 50 INJECTION, SOLUTION INTRAMUSCULAR; INTRAVENOUS at 00:31

## 2020-01-01 RX ADMIN — Medication 10 ML: at 23:22

## 2020-01-01 RX ADMIN — LABETALOL HYDROCHLORIDE 10 MG: 5 INJECTION INTRAVENOUS at 16:33

## 2020-01-01 RX ADMIN — VALPROATE SODIUM 625 MG: 100 INJECTION, SOLUTION INTRAVENOUS at 03:32

## 2020-01-01 RX ADMIN — DEXTROSE MONOHYDRATE 625 MG: 50 INJECTION, SOLUTION INTRAVENOUS at 20:12

## 2020-01-01 RX ADMIN — FOSPHENYTOIN SODIUM 100 MG PE: 50 INJECTION, SOLUTION INTRAMUSCULAR; INTRAVENOUS at 05:00

## 2020-01-01 RX ADMIN — Medication 10 ML: at 08:26

## 2020-01-01 RX ADMIN — CEFEPIME 2 G: 2 INJECTION, POWDER, FOR SOLUTION INTRAMUSCULAR; INTRAVENOUS at 12:29

## 2020-01-01 RX ADMIN — Medication: at 08:16

## 2020-01-01 RX ADMIN — METOPROLOL TARTRATE 5 MG: 5 INJECTION INTRAVENOUS at 05:35

## 2020-01-01 RX ADMIN — DEXTROSE MONOHYDRATE 625 MG: 50 INJECTION, SOLUTION INTRAVENOUS at 05:30

## 2020-01-01 RX ADMIN — INSULIN LISPRO 4 UNITS: 100 INJECTION, SOLUTION INTRAVENOUS; SUBCUTANEOUS at 12:59

## 2020-01-01 RX ADMIN — SODIUM CHLORIDE: 9 INJECTION, SOLUTION INTRAVENOUS at 00:33

## 2020-01-01 RX ADMIN — DEXTROSE MONOHYDRATE 625 MG: 50 INJECTION, SOLUTION INTRAVENOUS at 13:36

## 2020-01-01 RX ADMIN — POTASSIUM CHLORIDE 10 MEQ: 10 INJECTION, SOLUTION INTRAVENOUS at 05:51

## 2020-01-01 RX ADMIN — FOSPHENYTOIN SODIUM 100 MG PE: 50 INJECTION, SOLUTION INTRAMUSCULAR; INTRAVENOUS at 19:00

## 2020-01-01 RX ADMIN — Medication 10 ML: at 21:56

## 2020-01-01 RX ADMIN — ENOXAPARIN SODIUM 30 MG: 30 INJECTION SUBCUTANEOUS at 08:44

## 2020-01-01 RX ADMIN — ENOXAPARIN SODIUM 30 MG: 30 INJECTION SUBCUTANEOUS at 08:11

## 2020-01-01 RX ADMIN — Medication: at 08:04

## 2020-01-01 RX ADMIN — Medication: at 11:27

## 2020-01-01 RX ADMIN — DEXTROSE MONOHYDRATE 625 MG: 50 INJECTION, SOLUTION INTRAVENOUS at 11:00

## 2020-01-01 RX ADMIN — FOSPHENYTOIN SODIUM 100 MG PE: 50 INJECTION, SOLUTION INTRAMUSCULAR; INTRAVENOUS at 00:58

## 2020-01-01 RX ADMIN — ENOXAPARIN SODIUM 30 MG: 30 INJECTION SUBCUTANEOUS at 21:39

## 2020-01-01 RX ADMIN — ENOXAPARIN SODIUM 30 MG: 30 INJECTION SUBCUTANEOUS at 08:06

## 2020-01-01 RX ADMIN — FOSPHENYTOIN SODIUM 100 MG PE: 50 INJECTION, SOLUTION INTRAMUSCULAR; INTRAVENOUS at 00:59

## 2020-01-01 RX ADMIN — VALPROATE SODIUM 625 MG: 100 INJECTION, SOLUTION INTRAVENOUS at 03:14

## 2020-01-01 RX ADMIN — Medication: at 09:44

## 2020-01-01 RX ADMIN — LORAZEPAM 1 MG: 2 INJECTION INTRAMUSCULAR; INTRAVENOUS at 21:08

## 2020-01-01 RX ADMIN — VANCOMYCIN HYDROCHLORIDE 1500 MG: 10 INJECTION, POWDER, LYOPHILIZED, FOR SOLUTION INTRAVENOUS at 18:14

## 2020-01-01 RX ADMIN — INSULIN LISPRO 2 UNITS: 100 INJECTION, SOLUTION INTRAVENOUS; SUBCUTANEOUS at 00:56

## 2020-01-01 RX ADMIN — POTASSIUM CHLORIDE 10 MEQ: 10 INJECTION, SOLUTION INTRAVENOUS at 19:20

## 2020-01-01 RX ADMIN — Medication 10 ML: at 22:36

## 2020-01-01 RX ADMIN — VALPROATE SODIUM 625 MG: 100 INJECTION, SOLUTION INTRAVENOUS at 22:01

## 2020-01-01 RX ADMIN — LORAZEPAM 1 MG: 2 INJECTION INTRAMUSCULAR; INTRAVENOUS at 08:03

## 2020-01-01 RX ADMIN — ENOXAPARIN SODIUM 30 MG: 30 INJECTION SUBCUTANEOUS at 20:16

## 2020-01-01 RX ADMIN — METOPROLOL TARTRATE 5 MG: 5 INJECTION INTRAVENOUS at 16:18

## 2020-01-01 RX ADMIN — VANCOMYCIN HYDROCHLORIDE 1500 MG: 10 INJECTION, POWDER, LYOPHILIZED, FOR SOLUTION INTRAVENOUS at 18:21

## 2020-01-01 RX ADMIN — VANCOMYCIN HYDROCHLORIDE 2000 MG: 10 INJECTION, POWDER, LYOPHILIZED, FOR SOLUTION INTRAVENOUS at 04:12

## 2020-01-01 RX ADMIN — VALPROATE SODIUM 625 MG: 100 INJECTION, SOLUTION INTRAVENOUS at 06:50

## 2020-01-01 RX ADMIN — VANCOMYCIN HYDROCHLORIDE 1500 MG: 10 INJECTION, POWDER, LYOPHILIZED, FOR SOLUTION INTRAVENOUS at 17:40

## 2020-01-01 RX ADMIN — LORAZEPAM 1 MG: 2 INJECTION INTRAMUSCULAR; INTRAVENOUS at 08:15

## 2020-01-01 RX ADMIN — SODIUM CHLORIDE: 9 INJECTION, SOLUTION INTRAVENOUS at 16:55

## 2020-01-01 RX ADMIN — PIPERACILLIN AND TAZOBACTAM 3.38 G: 3; .375 INJECTION, POWDER, LYOPHILIZED, FOR SOLUTION INTRAVENOUS at 06:55

## 2020-01-01 RX ADMIN — Medication: at 08:06

## 2020-01-01 RX ADMIN — CEFEPIME 2 G: 2 INJECTION, POWDER, FOR SOLUTION INTRAMUSCULAR; INTRAVENOUS at 03:13

## 2020-01-01 RX ADMIN — ENOXAPARIN SODIUM 40 MG: 40 INJECTION SUBCUTANEOUS at 08:16

## 2020-01-01 RX ADMIN — NIFEDIPINE 30 MG: 30 TABLET, EXTENDED RELEASE ORAL at 08:37

## 2020-01-01 RX ADMIN — INSULIN LISPRO 2 UNITS: 100 INJECTION, SOLUTION INTRAVENOUS; SUBCUTANEOUS at 15:43

## 2020-01-01 RX ADMIN — POTASSIUM CHLORIDE 10 MEQ: 10 INJECTION, SOLUTION INTRAVENOUS at 00:04

## 2020-01-01 RX ADMIN — FOSPHENYTOIN SODIUM 100 MG PE: 50 INJECTION, SOLUTION INTRAMUSCULAR; INTRAVENOUS at 08:33

## 2020-01-01 RX ADMIN — ENOXAPARIN SODIUM 30 MG: 30 INJECTION SUBCUTANEOUS at 08:26

## 2020-01-01 RX ADMIN — Medication: at 23:08

## 2020-01-01 RX ADMIN — FOSPHENYTOIN SODIUM 100 MG PE: 50 INJECTION, SOLUTION INTRAMUSCULAR; INTRAVENOUS at 21:55

## 2020-01-01 RX ADMIN — DEXTROSE MONOHYDRATE 625 MG: 50 INJECTION, SOLUTION INTRAVENOUS at 04:51

## 2020-01-01 RX ADMIN — Medication 10 ML: at 08:07

## 2020-01-01 RX ADMIN — Medication 2694 ML: at 03:12

## 2020-01-01 RX ADMIN — REMDESIVIR 200 MG: 100 INJECTION, POWDER, LYOPHILIZED, FOR SOLUTION INTRAVENOUS at 22:48

## 2020-01-01 RX ADMIN — REMDESIVIR 100 MG: 100 INJECTION, POWDER, LYOPHILIZED, FOR SOLUTION INTRAVENOUS at 23:07

## 2020-01-01 RX ADMIN — CEFEPIME 2 G: 2 INJECTION, POWDER, FOR SOLUTION INTRAMUSCULAR; INTRAVENOUS at 21:34

## 2020-01-01 RX ADMIN — VANCOMYCIN HYDROCHLORIDE 1500 MG: 10 INJECTION, POWDER, LYOPHILIZED, FOR SOLUTION INTRAVENOUS at 18:07

## 2020-01-01 RX ADMIN — POTASSIUM CHLORIDE 20 MEQ: 1500 TABLET, EXTENDED RELEASE ORAL at 09:58

## 2020-01-01 RX ADMIN — VALPROATE SODIUM 625 MG: 100 INJECTION, SOLUTION INTRAVENOUS at 13:59

## 2020-01-01 RX ADMIN — VALPROATE SODIUM 625 MG: 100 INJECTION, SOLUTION INTRAVENOUS at 01:30

## 2020-01-01 RX ADMIN — Medication 10 ML: at 09:18

## 2020-01-01 RX ADMIN — Medication 10 ML: at 08:16

## 2020-01-01 RX ADMIN — Medication 10 ML: at 21:55

## 2020-01-01 RX ADMIN — DEXAMETHASONE SODIUM PHOSPHATE 6 MG: 4 INJECTION, SOLUTION INTRAMUSCULAR; INTRAVENOUS at 08:06

## 2020-01-01 RX ADMIN — CEFEPIME 2 G: 2 INJECTION, POWDER, FOR SOLUTION INTRAMUSCULAR; INTRAVENOUS at 21:07

## 2020-01-01 RX ADMIN — ENOXAPARIN SODIUM 30 MG: 30 INJECTION SUBCUTANEOUS at 21:28

## 2020-01-01 RX ADMIN — Medication: at 21:54

## 2020-01-01 RX ADMIN — CEFEPIME HYDROCHLORIDE 2 G: 2 INJECTION, POWDER, FOR SOLUTION INTRAVENOUS at 03:27

## 2020-01-01 RX ADMIN — DEXTROSE MONOHYDRATE 625 MG: 50 INJECTION, SOLUTION INTRAVENOUS at 16:43

## 2020-01-01 RX ADMIN — VALPROIC ACID 1250 MG: 250 SOLUTION ORAL at 21:45

## 2020-01-01 RX ADMIN — DEXTROSE MONOHYDRATE 625 MG: 50 INJECTION, SOLUTION INTRAVENOUS at 01:45

## 2020-01-01 RX ADMIN — Medication: at 22:37

## 2020-01-01 RX ADMIN — Medication: at 16:05

## 2020-01-01 RX ADMIN — Medication 10 ML: at 08:43

## 2020-01-01 RX ADMIN — DEXAMETHASONE SODIUM PHOSPHATE 6 MG: 4 INJECTION, SOLUTION INTRAMUSCULAR; INTRAVENOUS at 08:58

## 2020-01-01 RX ADMIN — POTASSIUM CHLORIDE 10 MEQ: 10 INJECTION, SOLUTION INTRAVENOUS at 01:27

## 2020-01-01 RX ADMIN — POTASSIUM CHLORIDE 10 MEQ: 10 INJECTION, SOLUTION INTRAVENOUS at 16:38

## 2020-01-01 RX ADMIN — DEXAMETHASONE SODIUM PHOSPHATE 6 MG: 4 INJECTION, SOLUTION INTRAMUSCULAR; INTRAVENOUS at 09:59

## 2020-01-01 RX ADMIN — ENOXAPARIN SODIUM 30 MG: 30 INJECTION SUBCUTANEOUS at 21:46

## 2020-01-01 RX ADMIN — LORAZEPAM 1 MG: 2 INJECTION INTRAMUSCULAR; INTRAVENOUS at 21:55

## 2020-01-01 RX ADMIN — POTASSIUM CHLORIDE AND DEXTROSE MONOHYDRATE 75 ML/HR: 150; 5 INJECTION, SOLUTION INTRAVENOUS at 11:46

## 2020-01-01 RX ADMIN — CEFEPIME HYDROCHLORIDE 2 G: 2 INJECTION, POWDER, FOR SOLUTION INTRAVENOUS at 16:56

## 2020-01-01 RX ADMIN — LORAZEPAM 1 MG: 2 INJECTION INTRAMUSCULAR; INTRAVENOUS at 08:42

## 2020-01-01 RX ADMIN — Medication: at 21:35

## 2020-01-01 RX ADMIN — DEXAMETHASONE SODIUM PHOSPHATE 6 MG: 4 INJECTION, SOLUTION INTRAMUSCULAR; INTRAVENOUS at 08:36

## 2020-01-01 RX ADMIN — SODIUM CHLORIDE: 9 INJECTION, SOLUTION INTRAVENOUS at 16:57

## 2020-01-01 RX ADMIN — POTASSIUM BICARBONATE 40 MEQ: 782 TABLET, EFFERVESCENT ORAL at 08:03

## 2020-01-01 RX ADMIN — FOSPHENYTOIN SODIUM 100 MG PE: 50 INJECTION, SOLUTION INTRAMUSCULAR; INTRAVENOUS at 05:03

## 2020-01-01 RX ADMIN — Medication: at 11:26

## 2020-01-01 RX ADMIN — FOSPHENYTOIN SODIUM 100 MG PE: 50 INJECTION, SOLUTION INTRAMUSCULAR; INTRAVENOUS at 00:02

## 2020-01-01 RX ADMIN — CEFEPIME 2 G: 2 INJECTION, POWDER, FOR SOLUTION INTRAMUSCULAR; INTRAVENOUS at 21:45

## 2020-01-01 RX ADMIN — Medication 10 ML: at 21:58

## 2020-01-01 RX ADMIN — CEFEPIME 2 G: 2 INJECTION, POWDER, FOR SOLUTION INTRAMUSCULAR; INTRAVENOUS at 05:30

## 2020-01-01 RX ADMIN — ACETAMINOPHEN 650 MG: 650 SUPPOSITORY RECTAL at 12:45

## 2020-01-01 RX ADMIN — DEXAMETHASONE SODIUM PHOSPHATE 6 MG: 4 INJECTION, SOLUTION INTRAMUSCULAR; INTRAVENOUS at 08:26

## 2020-01-01 RX ADMIN — FOSPHENYTOIN SODIUM 100 MG PE: 50 INJECTION, SOLUTION INTRAMUSCULAR; INTRAVENOUS at 18:07

## 2020-01-01 RX ADMIN — CEFEPIME HYDROCHLORIDE 2 G: 2 INJECTION, POWDER, FOR SOLUTION INTRAVENOUS at 16:52

## 2020-01-01 RX ADMIN — METOPROLOL TARTRATE 5 MG: 5 INJECTION INTRAVENOUS at 11:43

## 2020-01-01 RX ADMIN — SODIUM CHLORIDE: 9 INJECTION, SOLUTION INTRAVENOUS at 05:16

## 2020-01-01 RX ADMIN — DEXTROSE MONOHYDRATE 625 MG: 50 INJECTION, SOLUTION INTRAVENOUS at 01:07

## 2020-01-01 RX ADMIN — SODIUM CHLORIDE: 4.5 INJECTION, SOLUTION INTRAVENOUS at 13:47

## 2020-01-01 RX ADMIN — VALPROATE SODIUM 625 MG: 100 INJECTION, SOLUTION INTRAVENOUS at 18:34

## 2020-01-01 RX ADMIN — INSULIN LISPRO 2 UNITS: 100 INJECTION, SOLUTION INTRAVENOUS; SUBCUTANEOUS at 13:08

## 2020-01-01 RX ADMIN — CEFEPIME 2 G: 2 INJECTION, POWDER, FOR SOLUTION INTRAMUSCULAR; INTRAVENOUS at 12:59

## 2020-01-01 RX ADMIN — FOSPHENYTOIN SODIUM 100 MG PE: 50 INJECTION, SOLUTION INTRAMUSCULAR; INTRAVENOUS at 12:29

## 2020-01-01 RX ADMIN — Medication 10 ML: at 09:20

## 2020-01-01 RX ADMIN — POTASSIUM CHLORIDE 10 MEQ: 10 INJECTION, SOLUTION INTRAVENOUS at 17:41

## 2020-01-01 RX ADMIN — REMDESIVIR 100 MG: 100 INJECTION, POWDER, LYOPHILIZED, FOR SOLUTION INTRAVENOUS at 22:48

## 2020-01-01 RX ADMIN — ENOXAPARIN SODIUM 30 MG: 30 INJECTION SUBCUTANEOUS at 21:57

## 2020-01-01 RX ADMIN — CEFEPIME 2 G: 2 INJECTION, POWDER, FOR SOLUTION INTRAMUSCULAR; INTRAVENOUS at 21:38

## 2020-01-01 RX ADMIN — FOSPHENYTOIN SODIUM 100 MG PE: 50 INJECTION, SOLUTION INTRAMUSCULAR; INTRAVENOUS at 07:23

## 2020-01-01 RX ADMIN — SODIUM CHLORIDE 2694 ML: 0.9 INJECTION, SOLUTION INTRAVENOUS at 03:12

## 2020-01-01 RX ADMIN — DEXAMETHASONE SODIUM PHOSPHATE 6 MG: 4 INJECTION, SOLUTION INTRAMUSCULAR; INTRAVENOUS at 08:11

## 2020-01-01 RX ADMIN — DEXTROSE MONOHYDRATE 625 MG: 50 INJECTION, SOLUTION INTRAVENOUS at 06:44

## 2020-01-01 RX ADMIN — DEXTROSE MONOHYDRATE 625 MG: 50 INJECTION, SOLUTION INTRAVENOUS at 11:19

## 2020-01-01 RX ADMIN — DEXTROSE MONOHYDRATE 625 MG: 50 INJECTION, SOLUTION INTRAVENOUS at 23:00

## 2020-01-01 RX ADMIN — FOSPHENYTOIN SODIUM 100 MG PE: 50 INJECTION, SOLUTION INTRAMUSCULAR; INTRAVENOUS at 06:07

## 2020-01-01 RX ADMIN — REMDESIVIR 100 MG: 100 INJECTION, POWDER, LYOPHILIZED, FOR SOLUTION INTRAVENOUS at 23:00

## 2020-01-01 RX ADMIN — KETOROLAC TROMETHAMINE 30 MG: 30 INJECTION, SOLUTION INTRAMUSCULAR at 08:35

## 2020-01-01 RX ADMIN — LORAZEPAM 1 MG: 2 INJECTION INTRAMUSCULAR; INTRAVENOUS at 08:37

## 2020-01-01 RX ADMIN — ENOXAPARIN SODIUM 30 MG: 30 INJECTION SUBCUTANEOUS at 08:36

## 2020-01-01 RX ADMIN — ENOXAPARIN SODIUM 30 MG: 30 INJECTION SUBCUTANEOUS at 21:34

## 2020-01-01 RX ADMIN — ENOXAPARIN SODIUM 30 MG: 30 INJECTION SUBCUTANEOUS at 21:15

## 2020-01-01 RX ADMIN — ACETAMINOPHEN 650 MG: 650 SUPPOSITORY RECTAL at 06:12

## 2020-01-01 RX ADMIN — CEFEPIME 2 G: 2 INJECTION, POWDER, FOR SOLUTION INTRAMUSCULAR; INTRAVENOUS at 13:01

## 2020-01-01 RX ADMIN — FOSPHENYTOIN SODIUM 100 MG PE: 50 INJECTION, SOLUTION INTRAMUSCULAR; INTRAVENOUS at 12:10

## 2020-01-01 RX ADMIN — DEXTROSE MONOHYDRATE 625 MG: 50 INJECTION, SOLUTION INTRAVENOUS at 06:55

## 2020-01-01 RX ADMIN — METOPROLOL TARTRATE 5 MG: 5 INJECTION INTRAVENOUS at 19:01

## 2020-01-01 RX ADMIN — METOPROLOL TARTRATE 5 MG: 5 INJECTION INTRAVENOUS at 22:05

## 2020-01-01 RX ADMIN — CEFEPIME 2 G: 2 INJECTION, POWDER, FOR SOLUTION INTRAMUSCULAR; INTRAVENOUS at 13:28

## 2020-01-01 RX ADMIN — Medication: at 11:57

## 2020-01-01 RX ADMIN — FOSPHENYTOIN SODIUM 100 MG PE: 50 INJECTION, SOLUTION INTRAMUSCULAR; INTRAVENOUS at 01:00

## 2020-01-01 RX ADMIN — FOSPHENYTOIN SODIUM 100 MG PE: 50 INJECTION, SOLUTION INTRAMUSCULAR; INTRAVENOUS at 10:09

## 2020-01-01 RX ADMIN — MORPHINE SULFATE 4 MG: 4 INJECTION INTRAVENOUS at 10:59

## 2020-01-01 RX ADMIN — ENOXAPARIN SODIUM 30 MG: 30 INJECTION SUBCUTANEOUS at 21:55

## 2020-01-01 RX ADMIN — DEXTROSE MONOHYDRATE 625 MG: 50 INJECTION, SOLUTION INTRAVENOUS at 23:48

## 2020-01-01 RX ADMIN — Medication: at 22:08

## 2020-01-01 RX ADMIN — CEFEPIME 2 G: 2 INJECTION, POWDER, FOR SOLUTION INTRAMUSCULAR; INTRAVENOUS at 04:50

## 2020-01-01 RX ADMIN — VALPROATE SODIUM 625 MG: 100 INJECTION, SOLUTION INTRAVENOUS at 12:20

## 2020-01-01 RX ADMIN — FUROSEMIDE 40 MG: 10 INJECTION, SOLUTION INTRAMUSCULAR; INTRAVENOUS at 11:46

## 2020-01-01 RX ADMIN — FOSPHENYTOIN SODIUM 100 MG PE: 50 INJECTION, SOLUTION INTRAMUSCULAR; INTRAVENOUS at 14:39

## 2020-01-01 RX ADMIN — ACETAMINOPHEN 650 MG: 650 SUPPOSITORY RECTAL at 21:56

## 2020-01-01 RX ADMIN — FOSPHENYTOIN SODIUM 100 MG PE: 50 INJECTION, SOLUTION INTRAMUSCULAR; INTRAVENOUS at 11:17

## 2020-01-01 RX ADMIN — Medication 10 ML: at 21:51

## 2020-01-01 RX ADMIN — POTASSIUM CHLORIDE AND DEXTROSE MONOHYDRATE 75 ML/HR: 150; 5 INJECTION, SOLUTION INTRAVENOUS at 11:01

## 2020-01-01 RX ADMIN — INSULIN LISPRO 2 UNITS: 100 INJECTION, SOLUTION INTRAVENOUS; SUBCUTANEOUS at 23:26

## 2020-01-01 RX ADMIN — LORAZEPAM 0.5 MG: 2 INJECTION INTRAMUSCULAR; INTRAVENOUS at 22:02

## 2020-01-01 RX ADMIN — Medication: at 22:16

## 2020-01-01 RX ADMIN — PHENYTOIN 200 MG: 50 TABLET, CHEWABLE ORAL at 09:14

## 2020-01-01 RX ADMIN — KETOROLAC TROMETHAMINE 30 MG: 30 INJECTION, SOLUTION INTRAMUSCULAR at 23:34

## 2020-01-01 RX ADMIN — SODIUM CHLORIDE: 4.5 INJECTION, SOLUTION INTRAVENOUS at 09:22

## 2020-01-01 RX ADMIN — FOSPHENYTOIN SODIUM 100 MG PE: 50 INJECTION, SOLUTION INTRAMUSCULAR; INTRAVENOUS at 18:18

## 2020-01-01 RX ADMIN — LORAZEPAM 1 MG: 2 INJECTION INTRAMUSCULAR; INTRAVENOUS at 16:44

## 2020-01-01 RX ADMIN — ENOXAPARIN SODIUM 40 MG: 40 INJECTION SUBCUTANEOUS at 10:43

## 2020-01-01 RX ADMIN — LORAZEPAM 1 MG: 2 INJECTION INTRAMUSCULAR; INTRAVENOUS at 21:35

## 2020-01-01 RX ADMIN — FOSPHENYTOIN SODIUM 100 MG PE: 50 INJECTION, SOLUTION INTRAMUSCULAR; INTRAVENOUS at 23:50

## 2020-01-01 RX ADMIN — Medication 10 ML: at 20:16

## 2020-01-01 RX ADMIN — FOSPHENYTOIN SODIUM 100 MG PE: 50 INJECTION, SOLUTION INTRAMUSCULAR; INTRAVENOUS at 17:53

## 2020-01-01 RX ADMIN — Medication: at 10:51

## 2020-01-01 RX ADMIN — DEXAMETHASONE SODIUM PHOSPHATE 6 MG: 4 INJECTION, SOLUTION INTRAMUSCULAR; INTRAVENOUS at 08:38

## 2020-01-01 RX ADMIN — LORAZEPAM 1 MG: 2 INJECTION INTRAMUSCULAR; INTRAVENOUS at 08:58

## 2020-01-01 RX ADMIN — PHENYTOIN SODIUM 100 MG: 50 INJECTION INTRAMUSCULAR; INTRAVENOUS at 23:24

## 2020-01-01 RX ADMIN — METOPROLOL TARTRATE 5 MG: 5 INJECTION INTRAVENOUS at 23:56

## 2020-01-01 RX ADMIN — NIFEDIPINE 30 MG: 30 TABLET, EXTENDED RELEASE ORAL at 13:47

## 2020-01-01 RX ADMIN — VALPROATE SODIUM 625 MG: 100 INJECTION, SOLUTION INTRAVENOUS at 15:37

## 2020-01-01 ASSESSMENT — PAIN SCALES - PAIN ASSESSMENT IN ADVANCED DEMENTIA (PAINAD)
TOTALSCORE: 4
CONSOLABILITY: 0
BODYLANGUAGE: 0
CONSOLABILITY: 0
TOTALSCORE: 2
TOTALSCORE: 2
NEGVOCALIZATION: 1
FACIALEXPRESSION: 0
BODYLANGUAGE: 0
FACIALEXPRESSION: 0
CONSOLABILITY: 0
FACIALEXPRESSION: 0
CONSOLABILITY: 0
NEGVOCALIZATION: 1
BODYLANGUAGE: 0
FACIALEXPRESSION: 0
BODYLANGUAGE: 0
TOTALSCORE: 2
FACIALEXPRESSION: 0
NEGVOCALIZATION: 1
BREATHING: 1
BODYLANGUAGE: 0
FACIALEXPRESSION: 0
BREATHING: 1
TOTALSCORE: 0
FACIALEXPRESSION: 0
CONSOLABILITY: 0
BREATHING: 1
NEGVOCALIZATION: 0
FACIALEXPRESSION: 0
CONSOLABILITY: 0
FACIALEXPRESSION: 0
TOTALSCORE: 2
NEGVOCALIZATION: 0
CONSOLABILITY: 0
BREATHING: 1
BREATHING: 1
BREATHING: 0
TOTALSCORE: 2
TOTALSCORE: 2
BREATHING: 1
TOTALSCORE: 2
BREATHING: 1
NEGVOCALIZATION: 1
BREATHING: 1
TOTALSCORE: 2
NEGVOCALIZATION: 1
NEGVOCALIZATION: 0
CONSOLABILITY: 0
BODYLANGUAGE: 0
BODYLANGUAGE: 0
BREATHING: 1
BODYLANGUAGE: 0
CONSOLABILITY: 0
CONSOLABILITY: 0
TOTALSCORE: 1
BODYLANGUAGE: 0
BREATHING: 1
BREATHING: 1
CONSOLABILITY: 0
NEGVOCALIZATION: 0
BREATHING: 1
FACIALEXPRESSION: 0
BODYLANGUAGE: 0
FACIALEXPRESSION: 0
NEGVOCALIZATION: 0
TOTALSCORE: 1
NEGVOCALIZATION: 1
TOTALSCORE: 2
FACIALEXPRESSION: 0
CONSOLABILITY: 0
FACIALEXPRESSION: 0
BREATHING: 1
TOTALSCORE: 2
BODYLANGUAGE: 0
CONSOLABILITY: 0
TOTALSCORE: 2
BODYLANGUAGE: 0
FACIALEXPRESSION: 0
FACIALEXPRESSION: 0
TOTALSCORE: 0
FACIALEXPRESSION: 0
BREATHING: 1
CONSOLABILITY: 0
TOTALSCORE: 2
TOTALSCORE: 2
BODYLANGUAGE: 0
FACIALEXPRESSION: 0
BODYLANGUAGE: 1
NEGVOCALIZATION: 1
FACIALEXPRESSION: 0
TOTALSCORE: 2
CONSOLABILITY: 0
FACIALEXPRESSION: 0
BREATHING: 1
NEGVOCALIZATION: 0
CONSOLABILITY: 0
NEGVOCALIZATION: 1
CONSOLABILITY: 0
TOTALSCORE: 2
BREATHING: 1
NEGVOCALIZATION: 0
BREATHING: 1
BREATHING: 2
FACIALEXPRESSION: 0
CONSOLABILITY: 0
BREATHING: 1
BODYLANGUAGE: 0
FACIALEXPRESSION: 0
TOTALSCORE: 2
BODYLANGUAGE: 1
BODYLANGUAGE: 0
TOTALSCORE: 2
BODYLANGUAGE: 0
BREATHING: 1
CONSOLABILITY: 0
NEGVOCALIZATION: 1
BODYLANGUAGE: 1
FACIALEXPRESSION: 0
NEGVOCALIZATION: 1
TOTALSCORE: 2
BREATHING: 0
TOTALSCORE: 2
BREATHING: 1
NEGVOCALIZATION: 0
BREATHING: 1
FACIALEXPRESSION: 0
NEGVOCALIZATION: 1
TOTALSCORE: 2
FACIALEXPRESSION: 0
BODYLANGUAGE: 0
CONSOLABILITY: 0
TOTALSCORE: 0
CONSOLABILITY: 0
BODYLANGUAGE: 0
CONSOLABILITY: 0
TOTALSCORE: 2
TOTALSCORE: 2
NEGVOCALIZATION: 1
CONSOLABILITY: 0
BREATHING: 1
FACIALEXPRESSION: 0
NEGVOCALIZATION: 1
BODYLANGUAGE: 0
TOTALSCORE: 2
NEGVOCALIZATION: 1
NEGVOCALIZATION: 0
BREATHING: 2
BODYLANGUAGE: 0
BREATHING: 1
NEGVOCALIZATION: 0
BODYLANGUAGE: 1
TOTALSCORE: 1
TOTALSCORE: 2
FACIALEXPRESSION: 0
CONSOLABILITY: 0
BODYLANGUAGE: 0
FACIALEXPRESSION: 1
BODYLANGUAGE: 0
FACIALEXPRESSION: 0
NEGVOCALIZATION: 1
BREATHING: 1
BODYLANGUAGE: 0
TOTALSCORE: 2
CONSOLABILITY: 0
BREATHING: 1
FACIALEXPRESSION: 0
NEGVOCALIZATION: 1
FACIALEXPRESSION: 0
CONSOLABILITY: 0
BREATHING: 1
CONSOLABILITY: 0
TOTALSCORE: 2
BREATHING: 1
NEGVOCALIZATION: 1
CONSOLABILITY: 0
FACIALEXPRESSION: 0
CONSOLABILITY: 0
NEGVOCALIZATION: 1
BODYLANGUAGE: 0
BREATHING: 0
TOTALSCORE: 2
BODYLANGUAGE: 0
BREATHING: 1
TOTALSCORE: 1
TOTALSCORE: 2
CONSOLABILITY: 0
BODYLANGUAGE: 0
BODYLANGUAGE: 1
NEGVOCALIZATION: 1
FACIALEXPRESSION: 0
FACIALEXPRESSION: 0
BREATHING: 1
BREATHING: 0
CONSOLABILITY: 0
FACIALEXPRESSION: 0
FACIALEXPRESSION: 0
BODYLANGUAGE: 1
BODYLANGUAGE: 0
BREATHING: 2
TOTALSCORE: 2
TOTALSCORE: 2
FACIALEXPRESSION: 0
TOTALSCORE: 2
TOTALSCORE: 2
CONSOLABILITY: 0
TOTALSCORE: 0
NEGVOCALIZATION: 1
TOTALSCORE: 2
CONSOLABILITY: 0
NEGVOCALIZATION: 0
NEGVOCALIZATION: 1
FACIALEXPRESSION: 0
BREATHING: 1
NEGVOCALIZATION: 0
BODYLANGUAGE: 0
BODYLANGUAGE: 0
BREATHING: 1
BODYLANGUAGE: 0
BODYLANGUAGE: 1
CONSOLABILITY: 0
FACIALEXPRESSION: 0
FACIALEXPRESSION: 0
TOTALSCORE: 2
CONSOLABILITY: 0
FACIALEXPRESSION: 0
CONSOLABILITY: 0
CONSOLABILITY: 0
NEGVOCALIZATION: 0
CONSOLABILITY: 0
NEGVOCALIZATION: 0
FACIALEXPRESSION: 0
BODYLANGUAGE: 0
BREATHING: 1
BODYLANGUAGE: 1
BODYLANGUAGE: 0
FACIALEXPRESSION: 0
CONSOLABILITY: 0
BREATHING: 1
FACIALEXPRESSION: 0
NEGVOCALIZATION: 0
BREATHING: 1
TOTALSCORE: 1
NEGVOCALIZATION: 1
NEGVOCALIZATION: 1
BREATHING: 1
BREATHING: 1
TOTALSCORE: 2
FACIALEXPRESSION: 0
FACIALEXPRESSION: 0
BODYLANGUAGE: 0
TOTALSCORE: 0
NEGVOCALIZATION: 0
FACIALEXPRESSION: 0
CONSOLABILITY: 0
BODYLANGUAGE: 1
BODYLANGUAGE: 0
NEGVOCALIZATION: 0
NEGVOCALIZATION: 1
TOTALSCORE: 2
NEGVOCALIZATION: 0
TOTALSCORE: 2
BREATHING: 0
CONSOLABILITY: 0
NEGVOCALIZATION: 1
NEGVOCALIZATION: 0
BODYLANGUAGE: 0
NEGVOCALIZATION: 0
BREATHING: 1
NEGVOCALIZATION: 1
BODYLANGUAGE: 0
CONSOLABILITY: 0
FACIALEXPRESSION: 0
CONSOLABILITY: 0
TOTALSCORE: 0
CONSOLABILITY: 0
BODYLANGUAGE: 0
CONSOLABILITY: 0
BREATHING: 0
CONSOLABILITY: 0
BREATHING: 1
NEGVOCALIZATION: 1
FACIALEXPRESSION: 0
NEGVOCALIZATION: 1
NEGVOCALIZATION: 1
FACIALEXPRESSION: 0
CONSOLABILITY: 0
BODYLANGUAGE: 0
BODYLANGUAGE: 0
CONSOLABILITY: 0
BODYLANGUAGE: 0
BREATHING: 1
NEGVOCALIZATION: 0
FACIALEXPRESSION: 0
BREATHING: 1
FACIALEXPRESSION: 0
NEGVOCALIZATION: 0
BODYLANGUAGE: 0
TOTALSCORE: 2
CONSOLABILITY: 0
BODYLANGUAGE: 0
CONSOLABILITY: 0
TOTALSCORE: 2
BODYLANGUAGE: 0
BREATHING: 2
BREATHING: 0
TOTALSCORE: 0
BODYLANGUAGE: 0
TOTALSCORE: 2
CONSOLABILITY: 0

## 2020-01-01 ASSESSMENT — PAIN SCALES - WONG BAKER

## 2020-01-01 ASSESSMENT — PAIN SCALES - GENERAL
PAINLEVEL_OUTOF10: 0
PAINLEVEL_OUTOF10: 1
PAINLEVEL_OUTOF10: 0
PAINLEVEL_OUTOF10: 1
PAINLEVEL_OUTOF10: 0

## 2020-10-21 PROBLEM — J96.00 ACUTE RESPIRATORY FAILURE (HCC): Status: ACTIVE | Noted: 2020-01-01

## 2020-10-21 NOTE — ED PROVIDER NOTES
[address] ENCOUNTER          PHYSICIAN ASSISTANT NOTE       Date of evaluation: 10/21/2020    Chief Complaint     Fever; Diarrhea; and Shortness of Breath      History of Present Illness     Pj Wills is a 44 y.o. male who presents with complaints of fever. Patient is a resident of [facility] and was sent in for fever with increased work of breathing. According to EMS report the patient was on 6 L of oxygen via nasal cannula when they arrived and his oxygen saturations were in the 80s. The patient was put on a nonrebreather at 15 L and his oxygen improved to the low 90s. Per report patient is not on oxygen at baseline. Patient is [facility]. We have currently been unable to contact family. He is alert to person and city though is unable to provide any information or review of systems. He reports a remote multiple Covid positive patients at the facility in which the patient resides. Review of Systems     ROS: Unable to to assess due to mental status. Past Medical, Surgical, Family, and Social History     He has a past medical history of Alternating exotropia, Anemia, Aphasia, Bipolar affective ([address] Dementia in conditions classified elsewhere with behavioral disturbance, Fracture of nasal bone, Functional urinary incontinence, Hyperlipidemia, Mood disorder ([address] Peripheral vascular disease ([address] Schizophrenia ([address] Seizures ([address] and TBI (traumatic brain injury) ([address] He has a past surgical history that includes fracture surgery and brain surgery. His family history is not on file. He has an unknown smoking status. He has never used smokeless tobacco. He reports that he does not drink alcohol or use drugs. Medications     Previous Medications    ACETAMINOPHEN (TYLENOL) 500 MG TABLET    Take 500 mg by mouth every 4 hours as needed for Pain.     DESMOPRESSIN (DDAVP) 0.1 MG TABLET    Take 0.1 mg by mouth 2 times daily    DEXTROMETHORPHAN-GUAIFENESIN 120, Resp: 22, SpO2: 90 %    General: 44 y.o. male in no apparent distress, well developed, well nourished, non-toxic appearance. HEENT: Atraumatic, normocephalic. EOMs intact. Neck:  Full range of motion. Chest/pulm: Tachypneic. Increased work of breathing with prominent rhonchi throughout. Occasional productive cough. Positive belly breathing. Cardiovascular: Tachycardic. RRR, no murmurs, rubs, gallops appreciated. Abdomen: Large abdomen. soft, non-tender, non-peritoneal.    : Deferred. Musculoskeletal: No evident long bone or joint deformity. Neuro: A&O to person and city. Skin: Warm, dry. No obvious rashes, petechiae, or purpura. Psych: Appropriate mood and affect, normal interaction. Diagnostic Results     EKG   Interpreted in conjunction with emergency department physician Anh Quintero MD  Rhythm: sinus tachycardia  Rate: 110-120  Axis: normal  : none  Conduction: normal  ST Segments: nonspecific changes  T Waves: non specific changes  Q Waves:none  Clinical Impression: sinus tachycardia  Comparison:  04/29/2015    RADIOLOGY:  XR CHEST PORTABLE    (Results Pending)       LABS:   No results found for this visit on 10/21/20. RECENT VITALS:  BP: 129/75, Temp: 101.9 °F (38.8 °C), Pulse: 120, Resp: 22, SpO2: 90 %       ED Course     Nursing Notes, Past Medical Hx,Past Surgical Hx, Social Hx, Allergies, and Family Hx were reviewed. The patient was given the following medications:  Orders Placed This Encounter   Medications    cefepime (MAXIPIME) 2 g IVPB minibag    vancomycin (VANCOCIN) 2,000 mg in dextrose 5 % 500 mL IVPB     Order Specific Question:   Antimicrobial Indications     Answer:   Sepsis of Unknown Etiology    0.9 % sodium chloride IV bolus 2,694 mL       CONSULTS:  None    MEDICAL Art Stephens / Konstantin Magana / Camilo Verna is a 44 y.o. male who presents to the emergency department with complaints of fever.   On presentation, patient is ill-appearing with increased work of breathing. Patient is febrile with blood pressure within normal limits and heart rate of 120. A broad workup has been initiated. Vancomycin and cefepime have been ordered. Patient is currently on 15 L via nonrebreather with oxygen saturations of 90%. I attempted to contact the patient's family however was unsuccessful. At this time I am going off service and will be signing out care of the patient to my attending. The patient was seen and evaluated by the attending physician who agreed with the assessment and plan. Clinical Impression     1. Fever, unspecified fever cause        Disposition     PATIENT REFERRED TO:  No follow-up provider specified.     DISCHARGE MEDICATIONS:  New Prescriptions    No medications on file       Briseida Perdue  10/21/20 030

## 2020-10-21 NOTE — ACP (ADVANCE CARE PLANNING)
Advance Care Planning     Advance Care Planning (ACP) Physician/NP/PA (Provider) Rosalinda Darby      Date of ACP Conversation: 10/21/2020    Conversation Conducted with:    Healthcare Decision Maker: Named in Advance Directive or Healthcare Power of  (name) Liz Elise Decision Maker:    Current Designated Health Care Decision Maker:    Primary Decision Maker: Demetrius Burnham - Brother/Sister - [phone]    Care Preferences:    Hospitalization: \"If your health worsens and it becomes clear that your chance of recovery is unlikely, what would your preference be regarding hospitalization? \"  If the patient would want hospitalization, answer \"yes\". If the patient would prefer comfort-focused treatment without hospitalization, answer \"no\". YES/NO/WILD CARDS: unsure      Ventilation: \"If you were in your present state of health and suddenly became very ill and were unable to breathe on your own, what would your preference be about the use of a ventilator (breathing machine) if it was available to you? \"    If patient would desire the use of a ventilator (breathing machine), answer \"yes\", if not answer \"no\":no    \"If your health worsens and it becomes clear that your chance of recovery is unlikely, what would your preference be about the use of a ventilator (breathing machine) if it was available to you? \"   no    Resuscitation:  \"CPR works best to restart the heart when there is a sudden event, like a heart attack, in someone who is otherwise healthy. Unfortunately, CPR does not typically restart the heart for people who have serious health conditions or who are very sick. \"    \"In the event your heart stopped as a result of an underlying serious health condition, would you want attempts to be made to restart your heart (answer \"yes\" for attempt to resuscitate) or would you prefer a natural death (answer \"no\" for do not attempt to resuscitate)? \"   no     NOTE: If the patient has a valid advance directive AND provides care preference(s) that are inconsistent with that prior directive, advise the patient to consider either: creating a new advance directive that complies with state-specific requirements; or, if that is not possible, orally revoking that prior directive in accordance with state-specific requirements, which must be documented in the EHR.     Conversation Outcomes / Follow-Up Plan:   Entered DNR order (If yes, complete Durable DNR form)      Length of Voluntary ACP Conversation in minutes:  30 minutes    Ernst Gross

## 2020-10-21 NOTE — CARE COORDINATION
Referred to patient for d/c planning. Patient currently unable to interview. Patient is a 44year old male admitted for r/o COVID and hypoxia. Patient resides at [facility] and has since 2016 due to hx TBI. Patient does have DPOA on file. Referral to palliative care for return to facility vs hospice. Facility updated. Case Management Assessment           Initial Evaluation                Date / Time of Evaluation: 10/21/2020 4:56 PM                 Assessment Completed by: Ophelia Cook    Patient Name: Sadie Salgado     YOB: 1970  Diagnosis: Acute respiratory failure ([facility] [J96.00]  Acute respiratory failure ([facility] [J96.00]     Date / Time: 10/21/2020  1:57 AM    Patient Admission Status: Inpatient    If patient is discharged prior to next notation, then this note serves as note for discharge by case management. Current PCP: Opal Sanders MD  Clinic Patient: Yes    Chart Reviewed: Yes  Patient/ Family Interviewed: No    Initial assessment completed at bedside with: no    Hospitalization in the last 30 days: No    Emergency Contacts:  Extended Emergency Contact Information  Primary Emergency Contact: Jessica Hurtado  Address: [address] Phone: [phone]  Relation: Brother/Sister  Secondary Emergency Contact: Jayson Gilmore  Mobile Phone: [phone]  Relation: Brother/Sister    Advance Directives:   Code Status: [address]: Yes  Agent: brother Valerio  Contact Number:     Copy present: Yes     In paper Chart: Yes    Scanned into EMR Yes    Financial  Payor: Fransisco Trinidad / Plan: Polo Mendoza / Product Type: *No Product type* /     Pre-cert required for SNF: No    Pharmacy  No Pharmacies Listed    Potential assistance Purchasing Medications:    Does Patient want to participate in local refill/ meds to beds program?:      [employer] To [employer] Rules:  1.  Can ONLY be done Monday- Friday between 8:30am-5pm  2. Prescription(s) must be in pharmacy by 3pm to be filled same day  3. Copy of patient's insurance/ prescription drug card and patient face sheet must be sent along with the prescription(s)  4. Cost of Rx cannot be added to hospital bill. If financial assistance is needed, please contact unit  or ;  or  CANNOT provide pharmacy voucher for patients co-pays  5.  Patients can then  the prescription on their way out of the hospital at discharge, or pharmacy can deliver to the bedside if staff is available. (payment due at time of pick-up or delivery - cash, check, or card accepted)     Able to afford home medications/ co-pay costs: Yes    ADLS  Support Systems:      PT AM-PAC:   /24  OT AM-PAC:   /24    [facility] Veronica: Trish Berry, LTC  Steps:     Plans to RETURN to current housing: Yes  Barriers to RETURNING to current housing: medical complications    Home Care Information  Currently ACTIVE with [address]: No  Home Care Agency: Not Applicable      Durable Medical Equipment  DME Provider:   Equipment: provided by facility    Home Oxygen and Respiratory Equipment  Has HOME OXYGEN prior to admission: Yes  [address]: Not Applicable      DISCHARGE PLAN:  Disposition: [facility] ([facility]): [facility]  Phone: [phone]  Fax: [phone]    Transportation PLAN for discharge: EMS transportation     Factors facilitating achievement of predicted outcomes: Family support and Has needed Durable Medical Equipment at home    Barriers to discharge: Limited family support, Cognitive deficit, Limited participation, Limited insight into deficits and Medical complications    Additional Case Management Notes: see above    The Plan for Transition of Care is related to the following treatment goals of Acute respiratory failure ([address] [J96.00]  Acute respiratory failure ([address] [J96.00]    The Patient and/or patient representative Shane Tran and his family were provided with a choice of provider and agrees with the discharge plan Not Indicated    Freedom of choice list was provided with basic dialogue that supports the patient's individualized plan of care/goals and shares the quality data associated with the providers.  Not Indicated    Care Transition patient: SUZAN Ruvalcaba, KASSANDRA-S  [facility]  Case Management Department  Ph: [phone]

## 2020-10-21 NOTE — CONSULTS
Clinical Pharmacy Progress Note  Medication History     Admit Date: 10/21/2020    Asked by Dr. Sukhjinder Cid to clarify home medications. List of current medications patient is taking is complete. Home medication list in EPIC updated to reflect changes noted below. Source of [facility] medication list       The following medications were added to admission medication list:  Betadine solution BID to right knee until healed   Dexamethasone 6mg nightly (for COVID - started 10/19)   Famotidine 20mg daily   HCTZ 12.5mg daily   Melatonin 6mg nightly   Norco 5/325mg 1 tab q12h prn   Valproic acid 250mg/5mL 25mL BID       The following medications were removed from admission medication list:  Desmopressin  Mucinex DM   Lactase  Loperamide   Loratadine   Magic butt cream   Milk of mag  Olopatadine eye gtts   Ondansetron   Polycarbophil   PEG   Promethazine   Ranitidine       The following medication instructions/doses were adjusted to reflect how patient is taking:  Lorazepam changed from q6h prn to 1mg BID   Phenytoin dose change drom 250mg daily to 200mg BID   Lactulose changed from QID to TID   Quetiapine dose changed from 50mg BID to 100mg TID       Please note, the patient was on the following antibiotics PTA:  Ceftriaxone 1g IM x3 doses (10/20-10/23)   Azithromycin 500mg nightly x7 days (10/19-10/26)    Please call with questions.   Chelita Figueroa, PharmD, [address] [place] [address] Pharmacy: [phone]  [address] [place] [address]: [phone]  10/21/2020 4:18 PM

## 2020-10-21 NOTE — ED NOTES
Pt incontinent of large amount of stool.  Pt cleaned and repositioned on right side     George Pineda RN  10/21/20 7400

## 2020-10-21 NOTE — ED PROVIDER NOTES
ED Attending Attestation Note     Date of evaluation: 10/21/2020    This patient was seen by the advance practice provider. I have seen and examined the patient, agree with the workup, evaluation, management and diagnosis. The care plan has been discussed. I have reviewed the ECG and concur with the BREANN's interpretation. My assessment reveals an ill-appearing 58-year-old  male who presents from his nursing facility for increased respiratory distress. Here he is febrile and hypoxemic on 15 L nonrebreather mask. Crackles bilaterally and what appears to be bilateral infiltrates on chest x-ray. Given his overall presentation I must have increased concern for COVID-19 pneumonia but also for bacterial pneumonia. Broad-spectrum antibiotics have been initiated. He comes with a DNR comfort care order however when I call his primary contact Merle Montaño who identifies as his medical decision-maker. She initially indicated she wanted to completely reverse the DNR and pursue full measures. I discussed the rationale for this it seems that their family has undergone quite a bit of lost recently. We had a long and productive conversation that we have to respect what Jose Zelaya may want us to do based on his [facility] signed in 2015 and have ultimately changed his status to DNR comfort care arrest at this time. He will therefore be placed on bilevel positive pressure ventilation but I do not feel that endotracheal intubation is a good alternative for him at this time as my suspicion that he would ever be extubated is very low and that is to me not consistent with the wishes that are clearly expressed on his DNR. Family will discuss further steps while we continue aggressive treatment for possible bacterial pneumonia, but are aware that if this is COVID-19 pneumonia that the prognosis is likely grave.      Critical Care:  Due to the immediate potential for life-threatening deterioration due to acute respiratory failure with hypoxemia, I spent 43 minutes providing critical care. This time excludes time spent performing procedures but includes time spent on direct patient care, history retrieval, review of the chart, and discussions with patient, family, and consultant(s).        Eleonora Wasserman MD  10/21/20 0476
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
## 2020-10-21 NOTE — PROGRESS NOTES
4 Eyes Admission Assessment     I agree as the admission nurse that 2 RN's have performed a thorough Head to Toe Skin Assessment on the patient. ALL assessment sites listed below have been assessed on admission. Areas assessed by both nurses: [x]   Head, Face, and Ears   [x]   Shoulders, Back, and Chest  [x]   Arms, Elbows, and Hands   [x]   Coccyx, Sacrum, and Ischium-Large DTI to entirety of coccyx with redness and excoriation  [x]   Legs, Feet, and Heels- large blister to right heel, open blister to right and left inner knee        Does the Patient have Skin Breakdown?   Yes a wound was noted on the Admission Assessment and an LDA was Initiated documentation include the Estefani-wound, Wound Assessment, Measurements, Dressing Treatment, Drainage, and Color\",         Rah Prevention initiated:  Yes   Wound Care Orders initiated:  Yes      [facility] nurse consulted for Pressure Injury (Stage 3,4, Unstageable, DTI, NWPT, and Complex wounds) or Rah score 18 or lower:  Yes      Nurse 1 eSignature: Electronically signed by Shani Dunham RN on 10/21/20 at 4:00 PM EDT    **SHARE this note so that the co-signing nurse is able to place an eSignature**    Nurse 2 eSignature: {Esignature:[id number]}

## 2020-10-21 NOTE — ED NOTES
Patient incontinent of stool, ishan care performed and pad changed, patient repositioned for comfort, VSS on cardiac monitor.       Ольга Camacho RN  10/21/20 0260

## 2020-10-21 NOTE — CONSULTS
Clinical Pharmacy Consult Note    Admit date: 10/21/2020    Subjective/Objective:  44 yom with PMHx that includes TBI, seizure, schizophrenia, Bipolar, and PVD who presented to [facility] ED from NH with c/o fever and increased work of breathing. Patient febrile in ED (Tmax 101.9F), tachycardic, with elevated lactate and SCr. CXR revealed patchy opacities which may represent infectious/inflammatory vs neoplastic process. Patient requiring supplemental O2, currently on 15L NRB. Pharmacy is consulted to dose Vancomycin per Dr. Jesse Aponte    Pertinent Medications:  Cefepime 2g IV q12h -- day # 1  Vancomycin, pharmacy to dose -- day # 1   2g IV x1 (10/21)   1.25g IV q18h (10/21-current)       Recent Labs     10/21/20  0258      K 4.3   CL 99   CO2 26   BUN 39*   CREATININE 1.8*       Estimated Creatinine Clearance: 53 mL/min (A) (based on SCr of 1.8 mg/dL (H)). Recent Labs     10/21/20  0258   WBC 10.3   HGB 13.3*   HCT 40.0*   MCV 87.2          Height:  6' 0.01\" (182.9 cm)  Weight: 198 lb (89.8 kg)    Micro:  Blood x2 (10/21): sent  COVID-19 (10/21): collected  MRSA PCR (10/21): ordered    Assessment/Plan:  1. Concern for PNA, sepsis  :  Cefepime + vancomycin day #1  Vancomycin - pharmacy to dose  · Patient received loading dose in ED of 2g. Will start 1.25g IV q18h. Provides ~ 14 mg/kg and is based on a half-life elimination of 14 hours. · Levels will be ordered when appropriate. Target 15-20 mcg/mL. · Renal function will be monitored closely and dosing will be adjusted as appropriate. Cefepime  · 2g IV q24h adjusted to 2g IV q12h per renal function and indication. · Will monitor renal function and adjust dose as appropriate per [facility] policy. Please call with any questions.   Sunil Davis, PharmD, BCPS  Wireless: [id number]  Main pharmacy: [id number]  10/21/2020 2:30 PM
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
## 2020-10-21 NOTE — ED NOTES
Pt incontinent of large amount of liquid stool. Pt cleaned and repositioned on right side. Pt with subcutaneous needle from [facility] in posterior left thigh. Dressing and site covered in stool. Needle removed since pt has a peripheral IV placed in ED. Pt has large stage 3 ulcer to coccyx.        Michael Freedman RN  10/21/20 7450

## 2020-10-21 NOTE — PROGRESS NOTES
Pt admitted to floor from ED, VSS. Respiratory therapy in the room to manage BiPap, pt placed on continuous SpO2 monitoring, Avasys present in room. 4 eyes skin assessment completed with second RN, see note. New reynolds placed, telemetry placed. Pt oriented to room and call light, call light within reach. Multi-podus boots placed on bilateral feet, pt positioned on right side. Admitting MD at bedside.

## 2020-10-22 NOTE — PLAN OF CARE
Problem: Gas Exchange - Impaired  Goal: Absence of hypoxia  Outcome: Ongoing  Goal: Promote optimal lung function  Outcome: Ongoing   Pt on continuous BiPAP with 60% FiO2. Spo2 has been consistently above 90. Will continue to monitor. Problem: Skin Integrity:  Goal: Will show no infection signs and symptoms  Description: Will show no infection signs and symptoms  Outcome: Ongoing   Pt with no new skin breakdown during this shift. Pt's skin assessed with  each shift assessment. Pt turned and repositioned as scheduled. Will continue to monitor.

## 2020-10-22 NOTE — PROGRESS NOTES
Hospitalist Progress Note      PCP: Karsten Garsia MD    Date of Admission: 10/21/2020    Hospital course\"-this is a 78-year-old male with a past medical history of TBI, seizures, dementia, aphasia, schizophrenia who presented from nursing home with worsening shortness of breath and altered mental status    Subjective:   Patient continued to be altered not following commands  Covid test came back positive  Currently he is on high flow nasal cannula 15 L  C. difficile indeterminate    Medications:  Reviewed    Infusion Medications    sodium chloride 100 mL/hr at 10/22/20 0516    dextrose       Scheduled Medications    dexamethasone  6 mg Intravenous Q24H    enoxaparin  30 mg Subcutaneous BID    fosphenytoin (CEREBYX) maintenance dose IVPB  100 mg PE Intravenous Q6H    Venelex   Topical BID    sodium chloride flush  10 mL Intravenous 2 times per day    cefepime  2 g Intravenous Q12H    vancomycin  1,250 mg Intravenous Q18H    insulin lispro  0-12 Units Subcutaneous Q4H    LORazepam  1 mg Intravenous BID    valproate sodium (DEPACON) IVPB  625 mg Intravenous Q6H     PRN Meds: prochlorperazine, sodium chloride flush, acetaminophen **OR** acetaminophen, polyethylene glycol, promethazine **OR** [DISCONTINUED] ondansetron, glucose, dextrose, glucagon (rDNA), dextrose      Intake/Output Summary (Last 24 hours) at 10/22/2020 1434  Last data filed at 10/22/2020 0331  Gross per 24 hour   Intake 0 ml   Output 280 ml   Net -280 ml       Physical Exam Performed:    BP (!) 146/79   Pulse 101   Temp 101.5 °F (38.6 °C) (Axillary)   Resp 28   Ht 6' 0.01\" (1.829 m)   Wt 198 lb (89.8 kg)   SpO2 93%   BMI 26.85 kg/m²     General appearance: No apparent distress appears stated age and cooperative. On BiPAP,  not following commands, moving extremities spontaneously  HEENT Normal cephalic, atraumatic without obvious deformity. Pupils equal, round, and reactive to light. Extra ocular muscles intact. Recommend follow-   up/further workup. 3.  Left pleural effusion or thickening, especially along the    superolateral aspect. Assessment/Plan:    Active Hospital Problems    Diagnosis    Acute respiratory failure (HCC) [J96.00]    Shortness of breath [R06.02]    Advance care planning [Z71.89]      Acute hypoxic respiartory failure, with bilateral infiltrates   covid 19 positive  On iv empiric abx  Check d dimer, fibrinogen, LDH  Consult pulmonology, on Decadron  Consult palliative care, patient is DNR/ DNI  As needed BiPAP     DEBBY, baseline cr is unknown, likely prerenal from GI loss and infections   Continue iv fluids     Lactate acidosis  Trend lactate      Diarrhea  Check for .  Cdiff, indeterminate, check PCR  check stool pathogen  Hold lactulose     Sacral ulcer  Consult wound care    DVT Prophylaxis: Lovenox twice daily  Diet: Diet NPO Effective Now  Code Status: Limited    PT/OT Eval Status: N/A    Dispo -inpatient, poor prognosis, he on 15 L oxygen    Patricia Terrazas MD

## 2020-10-22 NOTE — PROCEDURES
Pt noted to have limited access in thumb area, decision per PICC Team to place extended dwell midline upper arm. POWER ENDURANCE UPPER ARM MIDLINE PROCEDURE NOTE  Chart reviewed for allergies, diagnosis, labs, known contraindications, reason for line placement and planned length of treatment. Insertion procedure discussed with patient/family member. Informed consent not required for Midline placement. Three patient identifiers - Patient name,   and MRN -  completed &  confirmed verbally. Hat, mask and eye shield donned. Midline site scrubbed with Chloraprep  One-Step applicator  for 30 seconds x 1. Hand Hygiene  performed with 3% Chlorhexidine surgical scrub x1 min prior to  Sterile gloves donned. Patient draped using sterile barrier technique. Midline site scrubbed a 2nd time with Chloraprep One-Step applicator x 30 sec. Vein located by Ultra Sound and site marked with sterile pen. Midline inserted. Positive brisk blood return obtained Midline flushed with 10 mls  0.9% Sterile Sodium Chloride. Midline flushes easily with no resistance. Valve placed on all lumens followed by Alcohol Swab Caps on end of each. Skin prep applied to site. Bio-patch in place. Catheter secured with non-sutured locking device per hospital protocol. Sterile Tegaderm applied over Midline site. Sterile field maintained during procedure. Guide wire accounted for post procedure. Pt. Response to procedure, tolerated well. Appearance of site: Clean dry and intact without bleeding or edema. All edges of Tegaderm occlusive. Site marked with date and initials of RN placing line. Top 2 side rails in up position, call button in reach, RN notified of all of the above. A Power Endurance Extended Dwell Midline 20g 8CM length placed in the KRIS brachial vein.

## 2020-10-22 NOTE — PROGRESS NOTES
NC.  HEENT: Pupils equal, round, and reactive to light. Conjunctivae/corneas clear. Neck: Supple, with full range of motion. No jugular venous distention. Trachea midline. Respiratory: Mildly increased respiratory effort. Still tachypneic, however less so than yesterday. Crackles appreciated bilaterally in the upper chest without wheezes. Cardiovascular: Regular rate and rhythm with normal S1/S2 without murmurs, rubs or gallops. Abdomen: Soft, non-tender, non-distended with normal bowel sounds. Musculoskeletal: No clubbing, cyanosis or edema bilaterally. Full range of motion without deformity. Trace pitting edema in the lower extremities bilaterally  Skin: Skin color, texture, turgor normal.  No rashes or lesions. Neurologic: Unable to assess secondary to patient's baseline mentation  Psychiatric: Unable to assess secondary to patient's baseline mentation  Capillary Refill: Brisk,< 3 seconds   Peripheral Pulses: +2 palpable, equal bilaterally       Labs:   Recent Labs     10/21/20  0258   WBC 10.3   HGB 13.3*   HCT 40.0*        Recent Labs     10/21/20  0258      K 4.3   CL 99   CO2 26   BUN 39*   CREATININE 1.8*   CALCIUM 8.3     No results for input(s): AST, ALT, BILIDIR, BILITOT, ALKPHOS in the last 72 hours. No results for input(s): INR in the last 72 hours. No results for input(s): Fabian Latham in the last 72 hours. Urinalysis:      Lab Results   Component Value Date    NITRU Negative 10/21/2020    WBCUA 3-5 10/21/2020    BACTERIA Rare 10/21/2020    RBCUA 21-50 10/21/2020    BLOODU LARGE 10/21/2020    SPECGRAV 1.025 10/21/2020    GLUCOSEU Negative 10/21/2020       Radiology:  XR CHEST PORTABLE   Final Result   1. Low lung volumes. 2.  Patchy opacities in the upper perihilar regions and scattered in the    right midlung zone and left lung base. May represent    infectious/inflammatory or neoplastic process. Recommend follow-   up/further workup.    3.  Left pleural effusion or thickening, especially along the    superolateral aspect. Assessment/Plan:    Kishore Barron, 44 y.o. male w/ TBI, Schizophrenia, Bipolar disorder and seizures who presented with Fever and hypoxia from his care facility. Admitted for Acute hypoxic respiratory failure. Suspicion for PNA as etiology. Active Hospital Problems    Diagnosis Date Noted    Acute respiratory failure ([facility] [address] [J96.00] 10/21/2020    Shortness of breath [R06.02]     Advance care planning [Z71.89]        Acute hypoxic respiratory failure 2/2 COVID vs bacterial PNA  CXR and CT from 2017 clear, therefore unlikely chronic etiology. CXR is atypical for COVID-19 as it is usually predominatly lower lobe pneumonia, but pt nevertheless positive for COVID  - Switched from bipap to 15LNC, and satting mid 90s.  - O2 goal of >90%  - Would continue broad spectrum antibiotics Vanc and cefepime   - Would add azithromycin to cover atypicals and to complete his 5 day course  - Strep pneumo and Legionella antigen  - Will follow up with blood cultures  - Procal 1.2  - Will check inflammatory markers: ,  ferritin 3,761,   - Will continue to monitor closely    COVID positive      DVT Prophylaxis: Lovenox  Diet: Diet NPO Effective Now  Code Status: Limited  PT/OT Eval Status: Not consulted  Dispo - [facility]. > 2-day hospital stay    I will discuss the patient with Dr Sina Hurst MD  Internal Medicine Resident, PGY-1  Perfect Serve    Patient seen, examined and discussed with the resident and I agree with the assessment and plan. Patient taken off bipap this morning because he was very tachypneic and was having some abdominal distention. Patient is minimally responsive which is not far from his baseline because of his previous traumatic brain injury. Off BiPAP he was able to saturate adequately on 15 L remains very tachypneic. Vapotherm is ordered if he needs it.   His oxygen requirements remain high and given his high inflammatory markers it seems likely that they will get worse before they get better.     Patient is DNR/DNI    Danita Tijerina MD

## 2020-10-22 NOTE — PROGRESS NOTES
NUTRITION NOTE   Admission Date: 10/21/2020     Type and Reason for Visit: Positive Nutrition Screen, Initial    NUTRITION RECOMMENDATIONS:   1. PO Diet: Continue NPO. Advance diet as tolerated to general or per MD.   2. ONS: start with diet advancement BID for wound healing. NUTRITION ASSESSMENT:  Nutrition eval for positive nutrition screen with wounds - per EMR pt w/DTI and excoriation at coccyx. Pt currently NPO and RD unable to see directly d/t covid +. RD will monitor for diet advancement and need for ONS. MALNUTRITION ASSESSMENT  Context of Malnutrition: Acute Illness   Malnutrition Status: No malnutrition    NUTRITION DIAGNOSIS   Problem: Problem #1: Inadequate oral intake  Etiology: Impaired respiratory function-inability to consume food  Signs & Symptoms: NPO status due to medical condition    NUTRITION INTERVENTION  Food and/or Nutrient Delivery:Continue NPO  Nutrition education/counseling/coordination of care: Continue Inpatient Monitoring     NUTRITION RISK LEVEL: Risk Level: Moderate        The patient will still be monitored per nutrition standards of care. Consult dietitian if nutrition interventions essential to patient care is needed.      Toy Garcia, [address] [place] [address]  Anthony:  [phone]  Office:  [phone]

## 2020-10-22 NOTE — CARE COORDINATION
Case Management Assessment           Daily Note                 Date/ Time of Note: 10/22/2020 12:26 PM         Note completed by: Amie Fierro    Patient Name: Iván López  YOB: 1970    Diagnosis:Acute respiratory failure ([facility]) [J96.00]  Acute respiratory failure ([facility]) [J96.00]  Patient Admission Status: Inpatient    Date of Admission:10/21/2020  1:57 AM Length of Stay: 1 GLOS:      Current Plan of Care: IV abx, IV steroids, IVF, 15L O2  ________________________________________________________________________________________  PT AM-PAC:   / 24 per last evaluation on:     OT AM-PAC:   / 24 per last evaluation on:     DME Needs for discharge: TBD  ________________________________________________________________________________________  Discharge Plan: [address] [place] [address] ([facility]): [facility]    Tentative discharge date: TBD    Current barriers to discharge:  Medical Complications      ________________________________________________________________________________________  Case Management Notes: Patient is from [facility]. ABRAM spoke with Everett Alfonso at facility. They are taking back their residents. ABRAM spoke with patient's brother, Adrian Carranza, who wants pt to continue care here and hopefully get back to [facility]. Lacey Mckay and his family were provided with choice of provider; he and his family are in agreement with the discharge plan.     Care Transition Patient: Leah Fierro RN  [facility]  Case Management Department  Ph: [phone]  Fax: [phone]

## 2020-10-22 NOTE — PROGRESS NOTES
Clinical Pharmacy Progress Note    ADMIT DATE: 10/21/2020     Interval update: COVID-19 positive - started on IV dexamethasone this AM.     45 yo M with PMH of TBI, seizures, dementia, aphasia, schizophrenia who presented from NH with worsening SOB, AMS. Admitted with respiratory failure - likely from PNA vs COVID-19; also with DEBBY and diarrhea. Pharmacy has been consulted to dose vancomycin per Dr. Devendra Schwab. PERTINENT MEDICATIONS:  Cefepime 2g IV q12h - day #2  Vancomycin - pharmacy to dose - day #2    10/21   1.25g IV q18h (10/21-current)    LABORATORY:  Recent Labs     10/21/20  0258      K 4.3   CL 99   CO2 26   BUN 39*   CREATININE 1.8*   GLUCOSE 298*     Estimated Creatinine Clearance: 53 mL/min (A) (based on SCr of 1.8 mg/dL (H)). Recent Labs     10/21/20  0258   WBC 10.3   HGB 13.3*        MICROBIOLOGY:  Blood (10/21) = Sent  CDiff toxin/antigen = Sent  MRSA nasal PCR = Sent  COVID-19 (10/21) = Positive    VITALS:  BP (!) 148/78   Pulse 106   Temp 99.3 °F (37.4 °C) (Axillary)   Resp (!) 40   Ht 6' 0.01\" (1.829 m)   Wt 198 lb (89.8 kg)   SpO2 91%   BMI 26.85 kg/m²     Intake/Output Summary (Last 24 hours) at 10/22/2020 0949  Last data filed at 10/22/2020 0331  Gross per 24 hour   Intake 0 ml   Output 280 ml   Net -280 ml     PROPHYLAXIS:  Stress ulcer: not indicated  VTE:  Enoxaparin     ASSESSMENT/PLAN:  1)  Respiratory failure - PNA: Cefepime + Vancomycin - Day #2  · Cefepime--  · Dose of 2g IV q12h is appropriate based on renal function & indication. Will continue to monitor. · Vancomycin--pharmacy to dose  · Admitted with DEBBY - unsure of baseline. No BMP today. · On 1.25g IV q18h. Will order BMP for today. · Trough will be ordered for tomorrow, prior to fourth dose. Target trough ~15-20 mcg/mL. · Clinical status will be monitored closely / dose will be adjusted as appropriate. Please call with any questions.   Vitor MengD, BCPS  Wireless: [id number]  or [phone]  10/22/2020 9:49 AM    Addendum 10/22/20 16:00:  · SCr improving - down to 1.1 today. · Given improvement in renal function, will adjust vancomycin to 1.25g IV q12h - based on estimated elimination half-life of ~9h. · Plan to check trough prior to fourth dose on current regimen.     Desi Hathaway PharmD, BCPS  Wireless: [id number]  or [phone]  10/22/2020 4:07 PM

## 2020-10-22 NOTE — PLAN OF CARE
Nutrition Problem #1: Inadequate oral intake  Intervention: Food and/or Nutrient Delivery: Continue NPO  Nutritional Goals: pt will tolerate diet advancement

## 2020-10-22 NOTE — PROGRESS NOTES
Palliative Care Chart Review  and Check in Note:     NAME:  Alexi Lopez Date: 10/21/2020  Hospital Day:  Hospital Day: 2   Current Code status: Limited    Palliative care is continuing to following Mr. Hurtado for symptom management,  and goals of care discussion as needed. Patient's chart reviewed today 10/22/20. Pt's NATALY Lopez called this morning for updates, notified her that the pt's COVID19 test came back positive. Explained that pt is still on bipap and that hopefully he will continue to tolerate the bipap mask. Jessica reports that the family is hopeful for recovery, they understand that the pt's situation is serious. Per family request, if unable to reach brother Lewis Winn, please call [facility] as the two live together. The following are the currently established goals/code status, and Symptom management. Goals of care: Continue with current management, pt/family is hopeful for recovery to baseline. Code status: DNR/DNI    Discharge plan: TBD pending hospital course, possibly return to [facility] when medically ready for discharge.       Aguilar Newton [facility]  Inpatient Palliative Care  [phone]

## 2020-10-23 NOTE — PROGRESS NOTES
Pulmonology PGY-1 Resident Progress Note        PCP: Francisco Moreno MD    Date of Admission: 10/21/2020    Chief Complaint/Consult: Hypoxia and fever    Subjective:   No acute events overnight.  Patient febrile to 100.6 yesterday evening.  C. difficile returned as indeterminate, GI panel came back negative.  Patient is still satting mid 90s on 15 L of high flow nasal cannula which has not increased since yesterday.  Patient remains on vancomycin and cefepime.  Patient seen at the bedside this a.m. he continues to be lethargic, however, he is still responsive to voice.  I am unable to understand responses however this seems to be his baseline. Medications:  Reviewed    Infusion Medications    sodium chloride 100 mL/hr at 10/23/20 0033    dextrose       Scheduled Medications    cefepime  2 g Intravenous Q8H    dexamethasone  6 mg Intravenous Q24H    enoxaparin  30 mg Subcutaneous BID    fosphenytoin (CEREBYX) maintenance dose IVPB  100 mg PE Intravenous Q6H    Venelex   Topical BID    vancomycin  1,250 mg Intravenous Q12H    sodium chloride flush  10 mL Intravenous 2 times per day    insulin lispro  0-12 Units Subcutaneous Q4H    LORazepam  1 mg Intravenous BID    valproate sodium (DEPACON) IVPB  625 mg Intravenous Q6H     PRN Meds: prochlorperazine, labetalol, sodium chloride flush, acetaminophen **OR** acetaminophen, polyethylene glycol, promethazine **OR** [DISCONTINUED] ondansetron, glucose, dextrose, glucagon (rDNA), dextrose      Intake/Output Summary (Last 24 hours) at 10/23/2020 1558  Last data filed at 10/23/2020 0006  Gross per 24 hour   Intake 0 ml   Output 1200 ml   Net -1200 ml       Physical Exam Performed:    BP (!) 160/86   Pulse 99   Temp 98.7 °F (37.1 °C) (Oral)   Resp 24   Ht 6' 0.01\" (1.829 m)   Wt 198 lb (89.8 kg)   SpO2 92%   BMI 26.85 kg/m²     General appearance: Mild distress, appears stated age. On 15L NC.  HEENT: Pupils equal, round, and reactive to light. Conjunctivae/corneas clear. Neck: Supple, with full range of motion. No jugular venous distention. Trachea midline. Respiratory: Mildly increased respiratory effort. Still tachypneic, however less so than yesterday. Crackles appreciated bilaterally in the upper chest without wheezes. Cardiovascular: Regular rate and rhythm with normal S1/S2 without murmurs, rubs or gallops. Abdomen: Soft, non-tender, non-distended with normal bowel sounds. Musculoskeletal: No clubbing, cyanosis or edema bilaterally. Full range of motion without deformity. Trace pitting edema in the lower extremities bilaterally  Skin: Skin color, texture, turgor normal.  No rashes or lesions. Neurologic: Unable to assess secondary to patient's baseline mentation  Psychiatric: Unable to assess secondary to patient's baseline mentation  Capillary Refill: Brisk,< 3 seconds   Peripheral Pulses: +2 palpable, equal bilaterally       Labs:   Recent Labs     10/21/20  0258   WBC 10.3   HGB 13.3*   HCT 40.0*        Recent Labs     10/21/20  0258 10/22/20  1044 10/23/20  0630    146* 148*   K 4.3 3.7 3.6   CL 99 108 111*   CO2 26 27 26   BUN 39* 30* 33*   CREATININE 1.8* 1.1 1.1   CALCIUM 8.3 8.2* 8.3     No results for input(s): AST, ALT, BILIDIR, BILITOT, ALKPHOS in the last 72 hours. No results for input(s): INR in the last 72 hours. No results for input(s): Kameron Altes in the last 72 hours. Urinalysis:      Lab Results   Component Value Date    NITRU Negative 10/21/2020    WBCUA 3-5 10/21/2020    BACTERIA Rare 10/21/2020    RBCUA 21-50 10/21/2020    BLOODU LARGE 10/21/2020    SPECGRAV 1.025 10/21/2020    GLUCOSEU Negative 10/21/2020       Radiology:  XR CHEST PORTABLE   Final Result   1. Low lung volumes. 2.  Patchy opacities in the upper perihilar regions and scattered in the    right midlung zone and left lung base. May represent    infectious/inflammatory or neoplastic process.   Recommend follow-   up/further benefit. However, it also just got approved for use in COVID-19 through the FDA which is an upgrade from its emergency utilization. Is unclear what the correct answer is. Patient's medical power of  had been requesting this medication I suspect because of what is available in certain aspects of the press. His renal function has improved so can be more safely dosed, I will check his hepatic function as one of the side effects for remdesivir is transaminitis. I will initiate it even though I have reservations on its effectiveness.     Amanda Kim MD

## 2020-10-23 NOTE — PROGRESS NOTES
Palliative Medicine Progress Note    Admit Date: 10/21/2020  Hospital Day:  Hospital Day: 3     CC: Respiratory distress  HPI: HPI PMH of TBI, Bipolar, dementia, HLD, PVD who presented from [facility] with respiratory distress, found to be covid+. Requiring 15 L oxygen. Recommendations:   Called brother/HCPOA Raz and gave him an update on pt's status. Also discussed the option of transitioning to comfort care and Kiah Cabrera says he will consider it, depending on pt does over the coming days. He is familiar with the [facility] [address] [place] [address] inpatient unit, which is taking covid+ patients. Offered emotional support. 1. Goals of Care/Advanced Care planning/Code status: DNR/DNI. Family wants to continue aggressive care but will consider comfort care/hospice if he is worsening. 2. Pain: unable to assess. Tylenol prn.  3. SOB: presented with respiratory distress and tested positive for Covid-19, currently on 15 L and dexamethasone. He was started on abx to cover for bacterial pneumonia. 4. Disposition: return to [facility] when medically ready, vs hospice inpatient unit if appropriate. Subjective:     Scheduled Meds:   cefepime  2 g Intravenous Q8H    dexamethasone  6 mg Intravenous Q24H    enoxaparin  30 mg Subcutaneous BID    fosphenytoin (CEREBYX) maintenance dose IVPB  100 mg PE Intravenous Q6H    Venelex   Topical BID    vancomycin  1,250 mg Intravenous Q12H    sodium chloride flush  10 mL Intravenous 2 times per day    insulin lispro  0-12 Units Subcutaneous Q4H    LORazepam  1 mg Intravenous BID    valproate sodium (DEPACON) IVPB  625 mg Intravenous Q6H       Continuous Infusions:   sodium chloride 100 mL/hr at 10/23/20 0033    dextrose         PRN Meds:prochlorperazine, labetalol, sodium chloride flush, acetaminophen **OR** acetaminophen, polyethylene glycol, promethazine **OR** [DISCONTINUED] ondansetron, glucose, dextrose, glucagon (rDNA), dextrose    Review of Systems. Review of Systems -   Unable to obtain due to covid rule out status and mental status. Performance status 20%    Objective:     Patient Vitals for the past 8 hrs:   BP Temp Temp src Pulse Resp SpO2   10/23/20 0811 (!) 162/84 99.4 °F (37.4 °C) Oral 103 22 92 %   10/23/20 0801 -- -- -- -- 24 95 %   10/23/20 0522 (!) 153/80 99.1 °F (37.3 °C) Oral 96 22 94 %   10/23/20 0407 -- -- -- -- -- 96 %     I/O last 3 completed shifts: In: 0   Out: 1200 [Urine:1200]  No intake/output data recorded. Physical Exam   Did not enter the room to examine pt, due to covid rule out status, to reduce exposure and conserve PPE. WBC/Hgb/Hct/Plts:  10.3/13.3/40.0/249 (10/21 0258)           Assessment:     Active Problems:    Acute respiratory failure (HCC)    Shortness of breath    Advance care planning  Resolved Problems:    * No resolved hospital problems. *    Family [phone]  Time spent with patient and/or family: 8  Time reviewing records: 5  Time communicating with providers: 5    A total of 20 minutes spent with the patient and family on unit greater than 50% face to face time in counseling regarding palliative care and goals of care for the patient.      Ammy Islas NP  [address]

## 2020-10-23 NOTE — CONSULTS
Remdesivir Initiation Note    Elda Mcwilliams meets criteria for initiation of remdesivir under the emergency use authorization (EUA) based on the following:   Known or suspected COVID-19   Severe disease (SpO2 ? 94% on RA, requiring supplemental O2, or requiring invasive mechanical ventilation)   Acceptable renal function  o CrCl ? 30 ml/min based on SCr obtained prior to initiation OR   o CrCl < 30 ml/min but the potential benefit of remdesivir outweighs the risk   Acceptable hepatic function (ALT within 5 times ULN)    RN/MD discussed with the patient/proxy information consistent with the Fact Sheet for Patients and Parents/Caregivers\" and the patient/proxy has agreed to initiating remdesivir after being:   Given the Fact Sheet for Patients and Parents/Caregivers   Informed of the alternatives to receiving remdesivir, and    Informed that remdesivir is an unapproved drug that is authorized for use under EUA    Liver function tests will be monitored daily while on remdesivir. Please call with questions.   Danya Campbell, PharmD, BCPS  [address] [place] Pharmacist  Wireless: [phone]  Main pharmacy: [id number]  10/23/2020 7:15 PM

## 2020-10-23 NOTE — PROGRESS NOTES
Conjunctivae/corneas clear. Neck: Supple, No jugular venous distention/bruits. Trachea midline without thyromegaly or adenopathy with full range of motion. Lungs: Bilateral rhonchi present   heart: Regular rate and rhythm with Normal S1/S2 without murmurs, rubs or gallops, point of maximum impulse non-displaced  Abdomen: Soft, non-tender or non-distended without rigidity or guarding and positive bowel sounds all four quadrants. Extremities: No clubbing, cyanosis, or edema bilaterally. Full range of motion without deformity and normal gait intact. Skin: Skin color, texture, turgor normal.  No rashes or lesions. Neurologic: lethargic, but awake,  neurovascularly intact with sensory/motor intact upper extremities/lower extremities, bilaterally. Cranial nerves: II-XII intact, grossly non-focal.  Mental status: ;etahrgic but awake   Capillary Refill: Acceptable  < 3 seconds  Peripheral Pulses: +3 Easily felt, not easily obliterated with pressure      Labs:   Recent Labs     10/21/20  0258   WBC 10.3   HGB 13.3*   HCT 40.0*        Recent Labs     10/21/20  0258 10/22/20  1044 10/23/20  0630    146* 148*   K 4.3 3.7 3.6   CL 99 108 111*   CO2 26 27 26   BUN 39* 30* 33*   CREATININE 1.8* 1.1 1.1   CALCIUM 8.3 8.2* 8.3     No results for input(s): AST, ALT, BILIDIR, BILITOT, ALKPHOS in the last 72 hours. No results for input(s): INR in the last 72 hours. No results for input(s): [place] in the last 72 hours. Urinalysis:      Lab Results   Component Value Date    NITRU Negative 10/21/2020    WBCUA 3-5 10/21/2020    BACTERIA Rare 10/21/2020    RBCUA 21-50 10/21/2020    BLOODU LARGE 10/21/2020    SPECGRAV 1.025 10/21/2020    GLUCOSEU Negative 10/21/2020       Radiology:  XR CHEST PORTABLE   Final Result   1. Low lung volumes. 2.  Patchy opacities in the upper perihilar regions and scattered in the    right midlung zone and left lung base.   May represent    infectious/inflammatory or neoplastic process. Recommend follow-   up/further workup. 3.  Left pleural effusion or thickening, especially along the    superolateral aspect. Assessment/Plan:    Active Hospital Problems    Diagnosis    Acute respiratory failure (HCC) [J96.00]    Shortness of breath [R06.02]    Advance care planning [Z71.89]      Acute hypoxic respiartory failure, with bilateral infiltrates   covid 19 positive  On iv empiric abx  Check d dimer, fibrinogen, LDH  Consult pulmonology, on Decadron  Consult palliative care, patient is DNR/ DNI, family is hopeful, he will be able to return to his facilty. Family was considering hospice on admission. As needed BiPAP     2. DEBBY, baseline cr is unknown, likely prerenal from GI loss and infections   Continue iv fluids  Improving      3. Lactate acidosis, improving   Trend lactate      4. Diarrhea  Check for . Cdiff, indeterminate, check PCR  check stool pathogen  Hold lactulose     5.  Sacral ulcer  Consult wound care    DVT Prophylaxis: Lovenox twice daily  Diet: DIET DYSPHAGIA PUREED; Mildly Thick (Nectar)  Code Status: Limited    PT/OT Eval Status: N/A    Dispo -inpatient, poor prognosis, he on 15 L oxygen, palliative following     Lupillo Greenberg MD

## 2020-10-23 NOTE — PLAN OF CARE
Problem: Skin Integrity:  Goal: Will show no infection signs and symptoms  Description: Will show no infection signs and symptoms  Outcome: Ongoing     Problem: Skin Integrity:  Goal: Absence of new skin breakdown  Description: Absence of new skin breakdown  Outcome: Ongoing  Venelex being applied to patients coccyx after stools. Will continue to monitor for neew signs and symptoms of breakdown      Problem: Falls - Risk of:  Goal: Will remain free from falls  Description: Will remain free from falls  Outcome: Ongoing     Problem: Airway Clearance - Ineffective  Goal: Achieve or maintain patent airway  Outcome: Ongoing     Problem: Gas Exchange - Impaired  Goal: Absence of hypoxia  Outcome: Ongoing   Patients oxygen saturation remains 88% and higher this shift on 15 L HFNC. Unable to wean patients oxygen due to desaturation. Continuous pulse ox on.

## 2020-10-23 NOTE — PLAN OF CARE
Completed swallow evaluation. Please refer to EMR.     Carrie Pickard M.A., Michelle Baker   Speech-Language Pathologist

## 2020-10-23 NOTE — PROGRESS NOTES
Late entry (1026)        Speech Language Pathology  Facility/Department: [facility]   CLINICAL BEDSIDE SWALLOW EVALUATION & Treatment Note    NAME: Karen Nj  : 1970  MRN: [id number]    ADMISSION DATE: 10/21/2020  ADMITTING DIAGNOSIS: has Encephalopathy; Thrombocytopenia ([address] Hypernatremia; Sepsis ([address] Acute respiratory failure with hypoxia ([address] Seizure disorder ([address] Traumatic brain injury ([address] Diabetes insipidus ([address] DEBBY (acute kidney injury) ([address] Acute encephalopathy; Acute hypernatremia; Severe dehydration; Encounter for palliative care; Dysphagia; Acute respiratory failure ([address] Shortness of breath; and Advance care planning on their problem list.  ONSET DATE: 10/21/2020    Recent Chest Xray: (10/23/2020)  Impression    1.  Low lung volumes. 2.  Patchy opacities in the upper perihilar regions and scattered in the    right midlung zone and left lung base.  May represent    infectious/inflammatory or neoplastic process.  Recommend follow-    up/further workup. 3.  Left pleural effusion or thickening, especially along the    superolateral aspect. Date of Eval: 10/23/2020  Evaluating Therapist: Mau Ervin    Current Diet level:  Current Diet : NPO  Current Liquid Diet : NPO      Primary Complaint  Patient Complaint: Did not state.     Pain:  Pain Assessment  Pain Assessment: 0-10  Pain Level: 0  PAINAD (Pain Assessment in Advance Dementia)  Breathing: occasional labored breathing, short period of hyperventilation  Negative Vocalization: none  Facial Expression: smiling or inexpressive  Body Language: tense, distressed pacing, fidgeting  Consolability: no need to console  PAINAD Score: 2    Reason for Referral  Karen Nj was referred for a bedside swallow evaluation to assess the efficiency of his swallow function, identify signs and symptoms of aspiration and make recommendations regarding safe dietary consistencies, effective compensatory strategies, and safe eating environment. Impression  Dysphagia Diagnosis: Suspected needs further assessment;Mild oral stage dysphagia  Dysphagia Impression : Patient presents with suspected mild-moderate oropharyngeal dysphagia. Pt with intermittent anterior labial loss of liquids on left, reduced oral control/prolonged oral prep stage, positive laryngeal movement with PO intake, no overt signs of aspiration/penetration. Given history of aspiration/deep penetration of thin liquids and current increased O2 demands, recommend nectar thick liquids via tsp and pureed solids, utilizing aspiration precuations and frequent oral hygeine. Will continue to follow. Dysphagia Outcome Severity Scale: Level 4: Mild moderate dysphagia- Intermittent supervision/cueing. One - two diet consistencies restricted     Treatment Plan  Requires SLP Intervention: Yes  Duration/Frequency of Treatment: 3-5x/wk for LOS        Recommended Diet and Intervention  Diet Solids Recommendation: Dysphagia Pureed (Dysphagia I)  Liquid Consistency Recommendation: Mildly Thick (Nectar)  Recommended Form of Meds: Crushed in puree as able  Recommendations: Dysphagia treatment;Assist feed  Therapeutic Interventions: Diet tolerance monitoring;Patient/Family education; Therapeutic PO trials with SLP;Oral care    Compensatory Swallowing Strategies  Compensatory Swallowing Strategies: Eat/Feed slowly; No straws;Upright as possible for all oral intake; Alternate solids and liquids;Small bites/sips;Assist feed    Treatment/Goals  Short-term Goals  Timeframe for Short-term Goals: 1-2 weeks or LOS  Goal 1: Patient will tolerate least restrictive diet without overt signs of aspiration. Goal 2: Patient will tolerate repeat instrumental swallow procedure as appropriate. Goal 3: Patient/caregiver will demonstrate understanding of swallowing concerns/recommendations. 10/23:  Addressed; educated patient to purpose of visit, dysphagia, aspiration, previous MBS, swallow diet recommendations, importance of upright position. With with some understanding, needs reinforcement. General  Chart Reviewed: Yes  Comments: Per admitting H&P (10/21/2020): 'The patient is a 44 y.o. male  With pmhx of TBI, Seizures, dementia, aphasia , schizophrenia who presented form nursing home  today with worsening SOB, and altered mental stattus. Multiple residents positive for covid 19 at his facility. ED workup with fever. B/ l inifltrates, suspicon for COVID, DEBBY, elevated lactate.'  Behavior/Cognition: Lethargic;Requires cueing; Alert; Cooperative  Respiratory Status: O2 via nasual cannula  O2 Device: Nasal cannula  Liters of Oxygen: (15 L)  Communication Observation: Aphasia  Follows Directions: Simple  Dentition: Some missing teeth  Patient Positioning: Upright in bed  Volitional Cough: Strong  Prior Dysphagia History: Patient has been seen by speech therapy previously, with multiple MBS completed. Aspiration of thin liqiuids via straw identified in 2014, with the most recent MBS results (2015) identifying deep penetration of thins; subsequent recommendations were for dysphagia II solids and nectar thick liquids. Consistencies Administered: Dysphagia Pureed (Dysphagia I); Nectar - teaspoon;Nectar - cup    Oral Motor Deficits  Oral/Motor  Oral Motor: Exceptions to [facility]  Labial ROM: Reduced left  Labial Symmetry: Abnormal symmetry left  Lingual Coordination: Reduced    Prognosis  Prognosis  Prognosis for safe diet advancement: fair  Barriers to reach goals: cognitive deficits;language deficits;severity of dysphagia  Individuals consulted  Consulted and agree with results and recommendations: Patient;RN    Education  Patient Education: Educated pt re: role of SLP, purpose of visit, recommendations.   Patient Education Response: Verbalizes understanding  Safety Devices in place: Yes  Type of devices: Left in bed;Bed alarm in place;Call light within reach       Therapy Time  SLP Individual Minutes  Time In: 0945  Time Out: 1026  Minutes: 39     SLP Total Treatment Time  Timed Code Treatment Minutes: 0 Minutes  Total Treatment Time: 41    Plan  Diet Recommendations: Pureed solids / mildly thick (nectar) liquids / meds crushed as able. If consistent signs of aspiration or associated decline in respiratory function occurs, recommend withhold PO and contact speech. Discharge Plan:  TBD  Discussed with RN, Teena Ya. Needs within reach. Electronically Signed by:  Jeremie Doyle M.A., Michelle Baker   Speech-Language Pathologist  Pager #[phone]    This document will serve as a discharge summary if pt discharges before next treatment.

## 2020-10-23 NOTE — PLAN OF CARE
Problem: Gas Exchange - Impaired  Goal: Absence of hypoxia  Outcome: Ongoing   Pt's transitioned from BiPaP to 15 liters high flow N/ C and he is tolerating it well. Vital signs are stable. Will continue to monitor. Problem: Skin Integrity:  Goal: Will show no infection signs and symptoms  Description: Will show no infection signs and symptoms  Outcome: Ongoing   Pt with no new skin breakdown during this shift. Pt's skin assessed with  each shift assessment. Pt turned and repositioned as scheduled. Will continue to monitor.

## 2020-10-23 NOTE — PROGRESS NOTES
Clinical Pharmacy Progress Note    ADMIT DATE: 10/21/2020     Interval update: Transitioned from Bipap to 15L HFNC overnight. 43 yo M with PMH of TBI, seizures, dementia, aphasia, schizophrenia who presented from NH with worsening SOB, AMS. Admitted with respiratory failure - likely from PNA vs COVID-19; also with DEBBY and diarrhea. Pharmacy has been consulted to dose vancomycin per Dr. Pushpa Morales. PERTINENT MEDICATIONS:  Cefepime 2g IV q12h - day #3  Vancomycin - pharmacy to dose - day #3    10/21   (10/21-10/22)   1.25g IV q12h (10/22-current)    LABORATORY:  Recent Labs     10/22/20  1044 10/23/20  0630   * 148*   K 3.7 3.6    111*   CO2 27 26   BUN 30* 33*   CREATININE 1.1 1.1   GLUCOSE 132* 107*     Estimated Creatinine Clearance: 87 mL/min (based on SCr of 1.1 mg/dL). Recent Labs     10/21/20  0258   WBC 10.3   HGB 13.3*        MICROBIOLOGY:  Blood (10/21) = NGTD x2  CDiff toxin/antigen (10/22) = Indeterminate   MRSA nasal PCR = Sent  COVID-19 (10/21) = Positive    VITALS:  BP (!) 162/84   Pulse 103   Temp 99.4 °F (37.4 °C) (Oral)   Resp 22   Ht 6' 0.01\" (1.829 m)   Wt 198 lb (89.8 kg)   SpO2 92%   BMI 26.85 kg/m²     Intake/Output Summary (Last 24 hours) at 10/23/2020 1050  Last data filed at 10/23/2020 0006  Gross per 24 hour   Intake 0 ml   Output 1200 ml   Net -1200 ml     PROPHYLAXIS:  Stress ulcer: not indicated  VTE:  Enoxaparin     ASSESSMENT/PLAN:  1)  Respiratory failure - PNA: Cefepime + Vancomycin - Day #3  · Cefepime--  · Will adjust to 2g IV q8h based on improvement in renal function per [facility] Renal Dose Adjustment Policy. · Will continue to monitor. · Vancomycin--pharmacy to dose  · Admitted with DEBBY, which has improved. SCr stable at 1.1 today. · On 1.25g IV q12h. Will continue current dose. · Trough will be ordered for tomorrow AM, prior to fourth dose. Target trough ~15-20 mcg/mL.   · Clinical status will be monitored closely / dose will be adjusted as appropriate. Please call with any questions.   Praful Barber PharmD, BCPS  Wireless: [phone]  or [phone]  10/23/2020 10:50 AM

## 2020-10-23 NOTE — PROGRESS NOTES
Pt's BP is 187/88 at this time. He is not on any antihypertensive agent. Hospiltalist notified. Will continue to monitor.

## 2020-10-23 NOTE — CARE COORDINATION
Case Management Assessment           Daily Note                 Date/ Time of Note: 10/23/2020 10:54 AM         Note completed by: Jeri Dunn    Patient Name: Jazzy Sanchez  YOB: 1970    Diagnosis:Acute respiratory failure ([address] [J96.00]  Acute respiratory failure ([facility] [address] [J96.00]  Patient Admission Status: Inpatient    Date of Admission:10/21/2020  1:57 AM Length of Stay: 2 GLOS:      Current Plan of Care:  Decadron, IV abx , 14L HC NC, PRN bipap, AMS, palliative following  ________________________________________________________________________________________  PT AM-PAC:   / 24 per last evaluation on:     OT AM-PAC:   / 24 per last evaluation on:     DME Needs for discharge: defer  ________________________________________________________________________________________  Discharge Plan: [address] [place] [address] ([facility]): [facility]    Tentative discharge date: TBD    Current barriers to discharge: medical clearance      ________________________________________________________________________________________  Case Management Notes:  Patient is from LTC at [facility], can return COVID positive. Family wants to continue care at this point, palliative care following. Cleavon Coca and his family were provided with choice of provider; he and his family are in agreement with the discharge plan.     Care Transition Patient: Leah Dunn RN  [facility]  Case Management Department  Ph: [phone]  Fax: [phone]

## 2020-10-24 NOTE — PROGRESS NOTES
SpO2 was 89% on 25L 100% vapotherm. Vapotherm increased to 40L 100%. SpO2 now 92% Dr Gayatri Howard notified.

## 2020-10-24 NOTE — PROGRESS NOTES
Hospitalist Progress Note      PCP: Dakota Sanchez MD    Date of Admission: 10/21/2020    Hospital course\"-this is a 42-year-old male with a past medical history of TBI, seizures, dementia, aphasia, schizophrenia who presented from nursing home with worsening shortness of breath and altered mental status, diarrhea. covid positive, on decadron. Subjective:   Patient continued to be lethargic. Currently he is on high flow nasal cannula 15 L, not able to hold conversation, responds to pain stimuli    Medications:  Reviewed    Infusion Medications    sodium chloride 100 mL/hr at 10/23/20 1655    dextrose       Scheduled Medications    vancomycin  1,500 mg Intravenous Q24H    cefepime  2 g Intravenous Q8H    remdesivir IVPB  100 mg Intravenous Q24H    dexamethasone  6 mg Intravenous Q24H    enoxaparin  30 mg Subcutaneous BID    fosphenytoin (CEREBYX) maintenance dose IVPB  100 mg PE Intravenous Q6H    Venelex   Topical BID    sodium chloride flush  10 mL Intravenous 2 times per day    insulin lispro  0-12 Units Subcutaneous Q4H    LORazepam  1 mg Intravenous BID    valproate sodium (DEPACON) IVPB  625 mg Intravenous Q6H     PRN Meds: sodium chloride, prochlorperazine, labetalol, sodium chloride flush, acetaminophen **OR** acetaminophen, polyethylene glycol, promethazine **OR** [DISCONTINUED] ondansetron, glucose, dextrose, glucagon (rDNA), dextrose      Intake/Output Summary (Last 24 hours) at 10/24/2020 1515  Last data filed at 10/24/2020 1400  Gross per 24 hour   Intake 360 ml   Output 2150 ml   Net -1790 ml       Physical Exam Performed:    BP (!) 142/75   Pulse 84   Temp 97.5 °F (36.4 °C) (Axillary)   Resp 28   Ht 6' 0.01\" (1.829 m)   Wt 198 lb (89.8 kg)   SpO2 94%   BMI 26.85 kg/m²     General appearance: No apparent distress appears stated age and cooperative. opens eyes to command, moving extremities spontaneously  HEENT Normal cephalic, atraumatic without obvious deformity.   Pupils equal, round, and reactive to light. Extra ocular muscles intact. Conjunctivae/corneas clear. Neck: Supple, No jugular venous distention/bruits. Trachea midline without thyromegaly or adenopathy with full range of motion. Lungs: Bilateral rhonchi present   heart: Regular rate and rhythm with Normal S1/S2 without murmurs, rubs or gallops, point of maximum impulse non-displaced  Abdomen: Soft, non-tender or non-distended without rigidity or guarding and positive bowel sounds all four quadrants. Extremities: No clubbing, cyanosis, or edema bilaterally. Full range of motion without deformity and normal gait intact. Skin: Skin color, texture, turgor normal.  No rashes or lesions. Neurologic: lethargic, but awake,  neurovascularly intact with sensory/motor intact upper extremities/lower extremities, bilaterally. Cranial nerves: II-XII intact, grossly non-focal.  Mental status: ;etahrgic but awake   Capillary Refill: Acceptable  < 3 seconds  Peripheral Pulses: +3 Easily felt, not easily obliterated with pressure  No change in PE today      Labs:   No results for input(s): WBC, HGB, HCT, PLT in the last 72 hours. Recent Labs     10/22/20  1044 10/23/20  0630 10/24/20  0430   * 148* 148*   K 3.7 3.6 3.9    111* 116*   CO2 27 26 22   BUN 30* 33* 28*   CREATININE 1.1 1.1 0.9   CALCIUM 8.2* 8.3 7.7*     Recent Labs     10/23/20  1800   *   ALT 93*   BILIDIR <0.2   BILITOT 0.3   ALKPHOS 97     No results for input(s): INR in the last 72 hours. No results for input(s): Fabian Cornish in the last 72 hours.     Urinalysis:      Lab Results   Component Value Date    NITRU Negative 10/21/2020    WBCUA 3-5 10/21/2020    BACTERIA Rare 10/21/2020    RBCUA 21-50 10/21/2020    BLOODU LARGE 10/21/2020    SPECGRAV 1.025 10/21/2020    GLUCOSEU Negative 10/21/2020       Radiology:  XR CHEST 1 VIEW   Final Result   Marked interval progression of airspace disease as detailed above likely reflecting worsening

## 2020-10-24 NOTE — PLAN OF CARE
Problem: Skin Integrity:  Goal: Will show no infection signs and symptoms  Description: Will show no infection signs and symptoms  10/24/2020 1017 by Alfredo Arzola RN  Outcome: Ongoing     Problem: Falls - Risk of:  Goal: Will remain free from falls  Description: Will remain free from falls  10/24/2020 1017 by Alfredo Arzola RN  Outcome: Ongoing     Problem: Airway Clearance - Ineffective  Goal: Achieve or maintain patent airway  10/24/2020 1017 by Alfredo Arzola RN  Outcome: Ongoing     Problem: Gas Exchange - Impaired  Goal: Absence of hypoxia  10/24/2020 1017 by Alfredo Arzola RN  Outcome: Ongoing     Problem: Breathing Pattern - Ineffective  Goal: Ability to achieve and maintain a regular respiratory rate  10/24/2020 1017 by Alfredo Arzola RN  Outcome: Ongoing     Problem:  Body Temperature -  Risk of, Imbalanced  Goal: Ability to maintain a body temperature within defined limits  10/24/2020 1017 by Alfredo Arzola RN  Outcome: Ongoing     Problem: Isolation Precautions - Risk of Spread of Infection  Goal: Prevent transmission of infection  10/24/2020 1017 by Alfredo Arzola RN  Outcome: Ongoing     Problem: Nutrition Deficits  Goal: Optimize nutrtional status  10/24/2020 1017 by Alfredo Arzola RN  Outcome: Ongoing     Problem: Risk for Fluid Volume Deficit  Goal: Maintain normal heart rhythm  10/24/2020 1017 by Alfredo Arzola RN  Outcome: Ongoing     Problem: Loneliness or Risk for Loneliness  Goal: Demonstrate positive use of time alone when socialization is not possible  10/24/2020 1017 by Alfredo Arzola RN  Outcome: Ongoing     Problem: Fatigue  Goal: Verbalize increase energy and improved vitality  10/24/2020 1017 by Alfredo Arzola RN  Outcome: Ongoing     Problem: Patient Education: Go to Patient Education Activity  Goal: Patient/Family Education  10/24/2020 1017 by Alfredo Arzola RN  Outcome: Ongoing     Problem: Nutrition  Goal: Optimal nutrition therapy  Description: Nutrition Problem #1: Inadequate oral intake  Intervention: Food and/or Nutrient Delivery: Continue NPO  Nutritional Goals: pt will tolerate diet advancement       10/24/2020 1017 by Ron Victor RN  Outcome: Ongoing

## 2020-10-24 NOTE — PROGRESS NOTES
Thank you for consulting [facility] in the care of your patient. A full consult will follow but if you have any questions I can be reached through our office at [phone] or through [address] [place] [address]. Thank you.   Dr. Chapo Rodriguez

## 2020-10-24 NOTE — PROGRESS NOTES
Pulmonary & Critical Care Medicine    Admit Date: 10/21/2020  PCP: Opal Sanders MD    CC:  COVID 19  Events of Last 24 hours:   Had an episode of hypoxia requiring vapotherm at 40 and 100%  There was no respiratory distress, he denied SOB, however it is hard to understand him with hx of TBI and schizophrenia. Vitals:  Tmax:  VITALS:  BP (!) 178/90   Pulse 107   Temp 96.6 °F (35.9 °C) (Axillary)   Resp 30   Ht 6' 0.01\" (1.829 m)   Wt 198 lb (89.8 kg)   SpO2 (!) 89%   BMI 26.85 kg/m²   24HR INTAKE/OUTPUT:      Intake/Output Summary (Last 24 hours) at 10/24/2020 1710  Last data filed at 10/24/2020 1400  Gross per 24 hour   Intake 120 ml   Output 2150 ml   Net -2030 ml     CURRENT PULSE OXIMETRY:  SpO2: (!) 89 %  24HR PULSE OXIMETRY RANGE:  SpO2  Av.8 %  Min: 88 %  Max: 98 %    EXAM:  General: No distress. Alert. Eyes: PERRL. No sclera icterus. No conjunctival injection. ENT: No discharge. Moist oral mucosa   Neck: Trachea midline. Neck is supple   Resp: No accessory muscle use. Scattered rhonchi   CV: Regular rate. Regular rhythm. No mumur or rub. No edema. GI: Non-tender. Non-distended. Normal bowel sounds. Skin: Warm and dry. No nodule on exposed extremities. No rash on exposed extremities. M/S: No cyanosis. No joint deformity. No clubbing. Neuro: Awake. Speech is clear. Psych: No anxiety or agitation.      Medications:    IV:   sodium chloride 100 mL/hr at 10/23/20 1655    dextrose           Scheduled Meds:   vancomycin  1,500 mg Intravenous Q24H    cefepime  2 g Intravenous Q8H    remdesivir IVPB  100 mg Intravenous Q24H    dexamethasone  6 mg Intravenous Q24H    enoxaparin  30 mg Subcutaneous BID    fosphenytoin (CEREBYX) maintenance dose IVPB  100 mg PE Intravenous Q6H    Venelex   Topical BID    sodium chloride flush  10 mL Intravenous 2 times per day    insulin lispro  0-12 Units Subcutaneous Q4H    LORazepam  1 mg Intravenous BID    valproate sodium (DEPACON) IVPB  625 mg Intravenous Q6H         Diet: DIET DYSPHAGIA PUREED; Mildly Thick (Nectar)     Results:  CBC: No results for input(s): WBC, HGB, HCT, MCV, PLT in the last 72 hours. BMP:   Recent Labs     10/22/20  1044 10/23/20  0630 10/24/20  0430   * 148* 148*   K 3.7 3.6 3.9    111* 116*   CO2 27 26 22   BUN 30* 33* 28*   CREATININE 1.1 1.1 0.9     LIVER PROFILE:   Recent Labs     10/23/20  1800   *   ALT 93*   BILIDIR <0.2   BILITOT 0.3   ALKPHOS 97     PT/INR: No results for input(s): PROTIME, INR in the last 72 hours. APTT: No results for input(s): APTT in the last 72 hours. UA:No results for input(s): NITRITE, COLORU, PHUR, LABCAST, WBCUA, RBCUA, MUCUS, TRICHOMONAS, YEAST, BACTERIA, CLARITYU, SPECGRAV, LEUKOCYTESUR, UROBILINOGEN, BILIRUBINUR, BLOODU, GLUCOSEU, AMORPHOUS in the last 72 hours. Invalid input(s): KETONESU      Assessment/Plan:  44 y.o. male with     Acute hypoxic respiratory failure   COVID 19 pneumonia. CXR reviewed and compared to prior CXR. There is worsening bilateral airspace disease.    Ferritin up to 6802   D-dimer 968    yesterday    Check LFT as her is remdesivir  Continue decadron   Titrate vapotherm to sats >=88%.   During evaluation I titrated him down to 80% and King Kidd MD yes no

## 2020-10-24 NOTE — PROGRESS NOTES
Clinical Pharmacy Progress Note    ADMIT DATE: 10/21/2020     Interval update: Remdesivir started last night. 45 yo M with PMH of TBI, seizures, dementia, aphasia, schizophrenia who presented from NH with worsening SOB, AMS. Admitted with respiratory failure - likely from PNA vs COVID-19; also with DEBBY and diarrhea. Pharmacy has been consulted to dose vancomycin per Dr. Maryam Harris. PERTINENT MEDICATIONS:  Cefepime 2g IV q12h - day #4  Vancomycin - pharmacy to dose - day #4    10/21   (10/21-10/22)   1.25g IV q12h (10/22-10/23)   1.5g IV q24h (10/24 - current)  Remdesivir 200mg IV x1, then 100mg IV q24h - day #2 of 5    Recent Labs     10/23/20  0630 10/24/20  0430   * 148*   K 3.6 3.9   * 116*   CO2 26 22   BUN 33* 28*   CREATININE 1.1 0.9   GLUCOSE 107* 127*     Estimated Creatinine Clearance: 107 mL/min (based on SCr of 0.9 mg/dL). No results for input(s): WBC, HGB, PLT in the last 72 hours. MICROBIOLOGY:  Blood (10/21) = NGTD x2  CDiff toxin/antigen (10/22) = Indeterminate   MRSA nasal PCR = Sent  COVID-19 (10/21) = Positive  Strep pneumo antigen (10/22) = negative  Legionella antigen (10/22) = negative    Vancomycin Levels:  Trough  10/24 @ 04:30 = 26.2mcg/mL (drawn ~1 hr early, on 1.25g IV q12h)    VITALS:  BP (!) 154/77   Pulse 98   Temp 96.7 °F (35.9 °C) (Axillary)   Resp 20   Ht 6' 0.01\" (1.829 m)   Wt 198 lb (89.8 kg)   SpO2 97%   BMI 26.85 kg/m²     Intake/Output Summary (Last 24 hours) at 10/24/2020 0857  Last data filed at 10/24/2020 0600  Gross per 24 hour   Intake 360 ml   Output 1500 ml   Net -1140 ml     PROPHYLAXIS:  Stress ulcer: not indicated  VTE:  Enoxaparin     ASSESSMENT/PLAN:  1)  Respiratory failure - PNA: Cefepime + Vancomycin - Day #4  · Cefepime--  Current dose remains appropriate based on indication and current renal function. Will continue to monitor and adjust as needed per [facility] Renal Dose Adjustment Policy.   · Vancomycin--pharmacy to dose  · Admitted with DEBBY, which has improved. SCr stable at 0.9  today. · Trough elevated this AM at 26.2mcg/mL (desired trough 15-20). Dose this AM held for high trough. · Will resume tonight at lower dose of 1.5g IV q24h. · Clinical status will be monitored closely / dose will be adjusted as appropriate.      Please call with questions--  Thanks--  Vinny Jauregui, PharmD, [address] [facility]  [phone] or [id number] ([facility])   10/24/2020 9:01 AM decreased ROM/PAIN

## 2020-10-24 NOTE — PLAN OF CARE
Problem: Skin Integrity:  Goal: Will show no infection signs and symptoms  Description: Will show no infection signs and symptoms  10/24/2020 0625 by Sharon Bethea RN  Outcome: Ongoing     Problem: Skin Integrity:  Goal: Absence of new skin breakdown  Description: Absence of new skin breakdown  10/24/2020 0625 by Sharon Bethea RN  Outcome: Ongoing     Problem: Falls - Risk of:  Goal: Will remain free from falls  Description: Will remain free from falls  10/24/2020 0625 by Sharon Bethea RN  Outcome: Ongoing     Problem: Falls - Risk of:  Goal: Absence of physical injury  Description: Absence of physical injury  Outcome: Ongoing     Problem: Airway Clearance - Ineffective  Goal: Achieve or maintain patent airway  10/24/2020 0625 by Sharon Bethea RN  Outcome: Ongoing     Problem: Gas Exchange - Impaired  Goal: Absence of hypoxia  10/24/2020 0625 by Sharon Bethea RN  Outcome: Ongoing     Problem: Gas Exchange - Impaired  Goal: Promote optimal lung function  Outcome: Ongoing     Problem: Breathing Pattern - Ineffective  Goal: Ability to achieve and maintain a regular respiratory rate  Outcome: Ongoing     Problem: Body Temperature -  Risk of, Imbalanced  Goal: Ability to maintain a body temperature within defined limits  Outcome: Ongoing     Problem: Body Temperature -  Risk of, Imbalanced  Goal: Will regain or maintain usual level of consciousness  Outcome: Ongoing     Problem:  Body Temperature -  Risk of, Imbalanced  Goal: Complications related to the disease process, condition or treatment will be avoided or minimized  Outcome: Ongoing     Problem: Isolation Precautions - Risk of Spread of Infection  Goal: Prevent transmission of infection  Outcome: Ongoing     Problem: Nutrition Deficits  Goal: Optimize nutrtional status  Outcome: Ongoing     Problem: Risk for Fluid Volume Deficit  Goal: Maintain normal heart rhythm  Outcome: Ongoing     Problem: Risk for Fluid Volume Deficit  Goal: Maintain absence of muscle cramping  Outcome: Ongoing     Problem: Risk for Fluid Volume Deficit  Goal: Maintain normal serum potassium, sodium, calcium, phosphorus, and pH  Outcome: Ongoing     Problem: Loneliness or Risk for Loneliness  Goal: Demonstrate positive use of time alone when socialization is not possible  Outcome: Ongoing     Problem: Fatigue  Goal: Verbalize increase energy and improved vitality  Outcome: Ongoing     Problem: Patient Education: Go to Patient Education Activity  Goal: Patient/Family Education  Outcome: Ongoing     Problem: Nutrition  Goal: Optimal nutrition therapy  Description: Nutrition Problem #1: Inadequate oral intake  Intervention: Food and/or Nutrient Delivery: Continue NPO  Nutritional Goals: pt will tolerate diet advancement       Outcome: Ongoing     Pt has remained free of falls and injury throughout the shift. Pt is on tele NSR. Pt has remained afebrile. Pt is on HFNC 15L with oxygenation >95%. Pt is only oriented to self. Is confused to place, time and situation. Pt is on a thickened liquid diet. Pt has DTI on buttocks which is left open to air and treated with venelex. Pt is incontinent and has a reynolds catheter with adequate output. Pt received first dose of remedesivir (see MAR). Pt blood sugar only needed coverage once throughout the night.

## 2020-10-24 NOTE — PROGRESS NOTES
PRN  prochlorperazine, 10 mg, Q6H PRN  labetalol, 10 mg, Q4H PRN  sodium chloride flush, 10 mL, PRN  acetaminophen, 650 mg, Q6H PRN    Or  acetaminophen, 650 mg, Q6H PRN  polyethylene glycol, 17 g, Daily PRN  promethazine, 12.5 mg, Q6H PRN  glucose, 15 g, PRN  dextrose, 12.5 g, PRN  glucagon (rDNA), 1 mg, PRN  dextrose, 100 mL/hr, PRN          Allergies: Codeine and Lactose      ROS:  See HPI. PE:      Gen: chronically ill appearing and responsive but lethargic / confused     HEENT:pupils equal and reactive, extraocular eye movements intact      Neuro: poor response     Neck:  supple, no significant adenopathy     Cardio: normal rate, regular rhythm, normal S1, S2, no murmurs, rubs, clicks or gallops      Resp: rales noted. GI:  soft, nontender, nondistended, no masses or organomegaly. Ext:  peripheral pulses normal, no pedal edema, no clubbing or cyanosis      MS: no joint tenderness, deformity or swelling      DERM: normal coloration and turgor, no rashesor bruising.        Vitals:   Patient Vitals for the past 8 hrs:   BP Temp Temp src Pulse Resp SpO2   10/24/20 1231 (!) 142/75 97.5 °F (36.4 °C) Axillary 84 28 94 %   10/24/20 1157 -- -- -- -- 28 93 %   10/24/20 1028 -- -- -- -- 28 98 %   10/24/20 0802 (!) 154/77 97.1 °F (36.2 °C) Axillary 98 20 97 %          I/Os:     Intake/Output Summary (Last 24 hours) at 10/24/2020 1436  Last data filed at 10/24/2020 1400  Gross per 24 hour   Intake 360 ml   Output 2150 ml   Net -1790 ml       LABS:    Lab Results   Component Value Date    CREATININE 0.9 10/24/2020    BUN 28 10/24/2020     10/24/2020    K 3.9 10/24/2020    K 4.3 10/21/2020     10/24/2020    CO2 22 10/24/2020     No results found for: [id number]   Lab Results   Component Value Date    WBC 10.3 10/21/2020    HGB 13.3 10/21/2020    HCT 40.0 10/21/2020    MCV 87.2 10/21/2020     10/21/2020      Lab Results   Component Value Date    IRON 46 05/01/2015    TIBC 158 05/01/2015 FERRITIN 6,802.0 10/24/2020      No results found for: Jimenez Arguello Results   Component Value Date    CALCIUM 7.7 10/24/2020    CAION 1.21 04/29/2015    PHOS 3.1 05/16/2015        Assessment / Plan:    Hypernatremia  · Likely fluid access related now on IVF NS and monitor for improvement in Na. · No need for aggressive workup at this time. · Na is only 148 on this admission and not nearly as high as in the past > 165. · Previously from poor oral intake in the face of lithium use. Lithium can cause polyuria. Monitor renal daily  · Appears to be of Lithium and now on HCTZ which has been held as well likely contributing. Hx of DEBBY  ·  Baseline creatinine is 0.7 and now 0.9  · Hematuria: resolved  Resp Failure / COVID +ve  · On Vanc, Remdesivir, Deadron, and Cefepime. NO Zosyn / Vanc combination   · Now on 15 L NC  Hypokalemia  · ok at 3.9 and yesterday given 40 meq       I thank Dr. Wen Grayson MD for consulting [facility] [phone] [facility] Nephrology in the care of your patient. Please call with any questions or concerns. [phone].   Mitch Stephen

## 2020-10-25 NOTE — PROGRESS NOTES
Patient Name: Denisa Burgos                                                    Primary Physician: Jovan Albarado MD  Admitting Dx: Acute respiratory failure [facility]) [J96.00]  Acute respiratory failure ([facility] [place] [address] [J96.00]    Dr. Rell Ng      Nephrology [facility]  [email or web address] [email or web address]                                       Interval Plan:     · Na now 149 on NS and change to 1/2 NS at 50 cc/hr. Not taking in adequate fluids. · Renal function stable though SCr of 0.9   · COVID +ve on Remdesivir, Decadonr,  Vanc and Cefepime. · BP high at times mostly 160/80s. Only home BP med is HCTZ now on hold. Started Nifedipine 30 mg. Assessment:     Hypernatremia  · Likely fluid access related now on IVF NS and monitor for improvement in Na. · No need for aggressive workup at this time. · Na is only 148 on this admission and not nearly as high as in the past > 165.     · Previously from poor oral intake in the face of lithium use. Lithium can cause polyuria. Monitor renal daily  · Appears to be of Lithium and now on HCTZ which has been held as well likely contributing. Hx of DEBBY  ·  Baseline creatinine is 0.7 and now 0.9  · Hematuria: resolved  Resp Failure / COVID +ve  · On Vanc, Remdesivir, Deadron, and Cefepime. NO Zosyn / Vanc combination   · Now on 15 L NC  Hypokalemia  · ok at 3.9 and yesterday given 40 meq      Please call our office at [phone] or [employer] with any questions or contact me directly. Subjective:     CC / Reason for Consult:    Hypernatremia    HPI/PMH:.  Denisa Burgos is a 44 y.o. male admitted for Resp Failure / COVID +ve with PMHx of traumatic brain injury with encephalopathy and seizure disorder, schizophrenia/bipolar disorder. Previously seen for hypernatremia as well and not taking in fluids or eating and admitted at [facility] with similar issues with hypernatremia, DEBBY and pneumonia.   Previously sodium was 167 and creatinine was 4.8 with BUN of 62. He was also hypotensive and receive IVF. Now Na is 148 and SCr is 0.9. Patient is not responsive and unable to provide history. ROS / Interval Hx: Patient seen in room with no changes. Denies difficulty breathing. Difficult to follow. Objective:        Gen: alert, well appearing, and in no distress     Neck:  supple, no significant adenopathy     Cardio: normal rate, regular rhythm, normal S1, S2, no murmurs, rubs, clicks or gallops      Resp: clear to auscultation, no wheezes, rales or rhonchi, symmetric air entry. GI:  soft, nontender, nondistended, no masses or organomegaly. Ext:  peripheral pulses normal, no pedal edema, no clubbing or cyanosis      MS: no joint tenderness, deformity or swelling      DERM: normal coloration and turgor, no rashesor bruising    Vitals:     BP (!) 164/86   Pulse 91   Temp 98.1 °F (36.7 °C) (Axillary)   Resp 22   Ht 6' 0.01\" (1.829 m)   Wt 198 lb (89.8 kg)   SpO2 96%   BMI 26.85 kg/m²      I/Os: Reviewed    Meds: Reviewed. Please see plan for changes made.     Labs:    Lab Results   Component Value Date    WBC 10.3 10/21/2020    HGB 13.3 10/21/2020    HCT 40.0 10/21/2020    MCV 87.2 10/21/2020     10/21/2020      Lab Results   Component Value Date    CREATININE 0.9 10/25/2020    BUN 22 10/25/2020     10/25/2020    K 2.6 10/25/2020    K 4.3 10/21/2020     10/25/2020    CO2 23 10/25/2020     No components found for: GLU   Lab Results   Component Value Date    CALCIUM 7.8 10/25/2020    CAION 1.21 04/29/2015    PHOS 3.1 05/16/2015      No results found for: [id number]   Lab Results   Component Value Date    IRON 46 05/01/2015    TIBC 158 05/01/2015    FERRITIN 6,802.0 10/24/2020      No results found for: Ge Jiang

## 2020-10-25 NOTE — PLAN OF CARE
Problem: Skin Integrity:  Goal: Will show no infection signs and symptoms  Description: Will show no infection signs and symptoms  Outcome: Ongoing     Problem: Skin Integrity:  Goal: Absence of new skin breakdown  Description: Absence of new skin breakdown  Outcome: Ongoing     Problem: Falls - Risk of:  Goal: Will remain free from falls  Description: Will remain free from falls  Outcome: Ongoing     Problem: Falls - Risk of:  Goal: Absence of physical injury  Description: Absence of physical injury  Outcome: Ongoing     Problem: Airway Clearance - Ineffective  Goal: Achieve or maintain patent airway  Outcome: Ongoing     Problem: Gas Exchange - Impaired  Goal: Absence of hypoxia  Outcome: Ongoing     Problem: Gas Exchange - Impaired  Goal: Promote optimal lung function  Outcome: Ongoing     Problem: Breathing Pattern - Ineffective  Goal: Ability to achieve and maintain a regular respiratory rate  Outcome: Ongoing     Problem: Body Temperature -  Risk of, Imbalanced  Goal: Ability to maintain a body temperature within defined limits  Outcome: Ongoing     Problem: Body Temperature -  Risk of, Imbalanced  Goal: Will regain or maintain usual level of consciousness  Outcome: Ongoing     Problem:  Body Temperature -  Risk of, Imbalanced  Goal: Complications related to the disease process, condition or treatment will be avoided or minimized  Outcome: Ongoing     Problem: Isolation Precautions - Risk of Spread of Infection  Goal: Prevent transmission of infection  Outcome: Ongoing     Problem: Nutrition Deficits  Goal: Optimize nutrtional status  Outcome: Ongoing     Problem: Risk for Fluid Volume Deficit  Goal: Maintain normal heart rhythm  Outcome: Ongoing     Problem: Risk for Fluid Volume Deficit  Goal: Maintain absence of muscle cramping  Outcome: Ongoing     Problem: Risk for Fluid Volume Deficit  Goal: Maintain normal serum potassium, sodium, calcium, phosphorus, and pH  Outcome: Ongoing     Problem: Loneliness or Risk for Loneliness  Goal: Demonstrate positive use of time alone when socialization is not possible  Outcome: Ongoing     Problem: Fatigue  Goal: Verbalize increase energy and improved vitality  Outcome: Ongoing     Problem: Patient Education: Go to Patient Education Activity  Goal: Patient/Family Education  Outcome: Ongoing     Problem: Nutrition  Goal: Optimal nutrition therapy  Description: Nutrition Problem #1: Inadequate oral intake  Intervention: Food and/or Nutrient Delivery: Continue NPO  Nutritional Goals: pt will tolerate diet advancement       Outcome: Ongoing     Pt oxygen requirements remain unchanged on vapo 35L 90%. Pt is on tele ST and had run of bigeminy overnight. Pt was asymptomatic. Pt wound on buttocks was treated with venelex. Wound is stage 2-3, and has moderate amounts of drainage. Wound is left open to air.

## 2020-10-25 NOTE — PLAN OF CARE
Problem: Skin Integrity:  Goal: Will show no infection signs and symptoms  Description: Will show no infection signs and symptoms  10/25/2020 0950 by Mary Jo Packer RN  Outcome: Ongoing     Problem: Falls - Risk of:  Goal: Will remain free from falls  Description: Will remain free from falls  10/25/2020 0950 by Mary Jo Packer RN  Outcome: Ongoing     Problem: Airway Clearance - Ineffective  Goal: Achieve or maintain patent airway  10/25/2020 0950 by Mary Jo Packer RN  Outcome: Ongoing     Problem: Gas Exchange - Impaired  Goal: Absence of hypoxia  10/25/2020 0950 by Mary Jo Packer RN  Outcome: Ongoing     Problem: Breathing Pattern - Ineffective  Goal: Ability to achieve and maintain a regular respiratory rate  10/25/2020 0950 by Mary Jo Packer RN  Outcome: Ongoing     Problem:  Body Temperature -  Risk of, Imbalanced  Goal: Ability to maintain a body temperature within defined limits  10/25/2020 0950 by Mary Jo Packer RN  Outcome: Ongoing     Problem: Isolation Precautions - Risk of Spread of Infection  Goal: Prevent transmission of infection  10/25/2020 0950 by Mary Jo Packer RN  Outcome: Ongoing     Problem: Nutrition Deficits  Goal: Optimize nutrtional status  10/25/2020 0950 by Mary Jo Packer RN  Outcome: Ongoing     Problem: Risk for Fluid Volume Deficit  Goal: Maintain normal heart rhythm  10/25/2020 0950 by Mary Jo Packer RN  Outcome: Ongoing     Problem: Loneliness or Risk for Loneliness  Goal: Demonstrate positive use of time alone when socialization is not possible  10/25/2020 0950 by Mary Jo Packer RN  Outcome: Ongoing     Problem: Fatigue  Goal: Verbalize increase energy and improved vitality  10/25/2020 0950 by Mary Jo Packer RN  Outcome: Ongoing     Problem: Patient Education: Go to Patient Education Activity  Goal: Patient/Family Education  10/25/2020 0950 by Mary Jo Packer RN  Outcome: Ongoing     Problem: Nutrition  Goal: Optimal nutrition therapy  Description: Nutrition Problem #1: Inadequate oral intake  Intervention: Food and/or Nutrient Delivery: Continue NPO  Nutritional Goals: pt will tolerate diet advancement       10/25/2020 0950 by Radha Carrera RN  Outcome: Ongoing 12-Aug-2020 21:23

## 2020-10-25 NOTE — PROGRESS NOTES
distention. Trachea midline. Respiratory:  Normal respiratory effort. Clear to auscultation, bilaterally without Rales/Wheezes/Rhonchi. Cardiovascular: Regular rate and rhythm with normal S1/S2 without murmurs, rubs or gallops. Abdomen: Soft, non-tender, non-distended with normal bowel sounds. Musculoskeletal: No clubbing, cyanosis or edema bilaterally. Full range of motion without deformity. Skin: Skin color, texture, turgor normal.  No rashes or lesions. Neurologic:  Neurovascularly intact without any focal sensory/motor deficits. Cranial nerves: II-XII intact, grossly non-focal.  Psychiatric: Alert and oriented, thought content appropriate, normal insight  Capillary Refill: Brisk,< 3 seconds   Peripheral Pulses: +2 palpable, equal bilaterally       Labs:   No results for input(s): WBC, HGB, HCT, PLT in the last 72 hours. Recent Labs     10/23/20  0630 10/24/20  0430 10/25/20  0530   * 148* 149*   K 3.6 3.9 2.6*   * 116* 117*   CO2 26 22 23   BUN 33* 28* 22*   CREATININE 1.1 0.9 0.9   CALCIUM 8.3 7.7* 7.8*     Recent Labs     10/23/20  1800 10/25/20  0530   * 93*   ALT 93* 57*   BILIDIR <0.2 <0.2   BILITOT 0.3 0.3   ALKPHOS 97 85     No results for input(s): INR in the last 72 hours. No results for input(s): Alise Beat in the last 72 hours. Urinalysis:      Lab Results   Component Value Date    NITRU Negative 10/21/2020    WBCUA 3-5 10/21/2020    BACTERIA Rare 10/21/2020    RBCUA 21-50 10/21/2020    BLOODU LARGE 10/21/2020    SPECGRAV 1.025 10/21/2020    GLUCOSEU Negative 10/21/2020       Radiology:  XR CHEST 1 VIEW   Final Result   Marked interval progression of airspace disease as detailed above likely reflecting worsening pneumonia. XR CHEST PORTABLE   Final Result   1. Low lung volumes. 2.  Patchy opacities in the upper perihilar regions and scattered in the    right midlung zone and left lung base. May represent    infectious/inflammatory or neoplastic process. Recommend follow-   up/further workup. 3.  Left pleural effusion or thickening, especially along the    superolateral aspect. Assessment/Plan:    Active Hospital Problems    Diagnosis    Acute respiratory failure ([facility] [address] [J96.00]    Shortness of breath [R06.02]    Advance care planning [Z71.89]     1. Acute respiratory failure with hypoxia, COVID-19 positive pulmonary consulted and following on Decadron, Remdesvir, IV antibiotic prophylactic. 2.  Acute kidney injury nephrology consulted and following, hypernatremia IV fluids half-normal saline, check daily BMP    3. Sacral ulcer wound care consulted. 4.  Hypokalemia status post supplement repeat potassium 4.6 at 1530.    5.  History of schizophrenia, dementia nursing home resident. 6.  Abnormal liver function test monitor closely.         DVT Prophylaxis: Lovenox subcu  Diet: DIET DYSPHAGIA PUREED; Mildly Thick (Nectar)  Code Status: Limited    PT/OT Eval Status:     Harman Pavon MD

## 2020-10-25 NOTE — PROGRESS NOTES
Pt family member Vicky Chery- son) contacted and notified of patient current status and plan of care. All questions answered.  Electronically signed by Irasema Howard RN on 10/25/2020 at 8:11 AM

## 2020-10-25 NOTE — PROGRESS NOTES
Clinical Pharmacy Progress Note    ADMIT DATE: 10/21/2020     Interval update: On day 3 of Remdesivir. Now on vapotherm at [address] (up from 15L HFNC yesterday). 43 yo M with PMH of TBI, seizures, dementia, aphasia, schizophrenia who presented from NH with worsening SOB, AMS. Admitted with respiratory failure - likely from PNA vs COVID-19; also with DEBBY and diarrhea. Pharmacy has been consulted to dose vancomycin per Dr. Jesse Aponte. PERTINENT MEDICATIONS:  Cefepime 2g IV q12h - day #5  Vancomycin - pharmacy to dose - day #5    10/21   (10/21-10/22)   1.25g IV q12h (10/22-10/23)   1.5g IV q24h (10/24 - current)  Remdesivir 200mg IV x1, then 100mg IV q24h - day #3 of 5    Recent Labs     10/24/20  0430 10/25/20  0530   * 149*   K 3.9 2.6*   * 117*   CO2 22 23   BUN 28* 22*   CREATININE 0.9 0.9   GLUCOSE 127* 124*     Estimated Creatinine Clearance: 107 mL/min (based on SCr of 0.9 mg/dL). No results for input(s): WBC, HGB, PLT in the last 72 hours. MICROBIOLOGY:  Blood (10/21) = NGTD x2  CDiff toxin/antigen (10/22) = Indeterminate   MRSA nasal PCR = Sent  COVID-19 (10/21) = Positive  Strep pneumo antigen (10/22) = negative  Legionella antigen (10/22) = negative    Vancomycin Levels:  Trough  10/24 @ 04:30 = 26.2mcg/mL (drawn ~1 hr early, on 1.25g IV q12h)    VITALS:  BP (!) 158/85   Pulse 98   Temp 97.3 °F (36.3 °C) (Axillary)   Resp 22   Ht 6' 0.01\" (1.829 m)   Wt 198 lb (89.8 kg)   SpO2 96%   BMI 26.85 kg/m²     Intake/Output Summary (Last 24 hours) at 10/25/2020 1439  Last data filed at 10/25/2020 1311  Gross per 24 hour   Intake 460 ml   Output 1900 ml   Net -1440 ml     PROPHYLAXIS:  Stress ulcer: not indicated  VTE:  Enoxaparin     ASSESSMENT/PLAN:  1)  Respiratory failure - PNA: Cefepime + Vancomycin - Day #5  · Cefepime--  Current dose remains appropriate based on indication and current renal function.   Will continue to monitor and adjust as needed per [facility] Renal Dose Adjustment

## 2020-10-25 NOTE — PROGRESS NOTES
Updated brother Wen Des on medical status, all questions answered.  Electronically signed by Ayala Hartmann RN on 10/24/2020 at 8:33 PM

## 2020-10-26 NOTE — PROGRESS NOTES
Hospitalist Progress Note      PCP: Joann Diaz MD    Date of Admission: 10/21/2020    Chief Complaint: SOB    Subjective:  Patient seen and examined at the bedside. Continues on HiFlow with increasing settings. PFHS: reviewed as documented 10/21/2020, no changes    Medications:  Reviewed    Infusion Medications    sodium chloride 50 mL/hr at 10/26/20 9726    dextrose       Scheduled Medications    potassium chloride  40 mEq Oral Daily    NIFEdipine  30 mg Oral Daily    vancomycin  1,500 mg Intravenous Q24H    cefepime  2 g Intravenous Q8H    remdesivir IVPB  100 mg Intravenous Q24H    dexamethasone  6 mg Intravenous Q24H    enoxaparin  30 mg Subcutaneous BID    fosphenytoin (CEREBYX) maintenance dose IVPB  100 mg PE Intravenous Q6H    Venelex   Topical BID    sodium chloride flush  10 mL Intravenous 2 times per day    insulin lispro  0-12 Units Subcutaneous Q4H    LORazepam  1 mg Intravenous BID    valproate sodium (DEPACON) IVPB  625 mg Intravenous Q6H     PRN Meds: sodium chloride, prochlorperazine, labetalol, sodium chloride flush, acetaminophen **OR** acetaminophen, polyethylene glycol, promethazine **OR** [DISCONTINUED] ondansetron, glucose, dextrose, glucagon (rDNA), dextrose      Intake/Output Summary (Last 24 hours) at 10/26/2020 0839  Last data filed at 10/26/2020 0502  Gross per 24 hour   Intake 97328.02 ml   Output 1925 ml   Net 52400.02 ml         Physical Exam Performed:    BP (!) 146/79   Pulse 98   Temp 97.7 °F (36.5 °C) (Oral)   Resp 24   Ht 6' 0.01\" (1.829 m)   Wt 198 lb (89.8 kg)   SpO2 96%   BMI 26.85 kg/m²     General appearance:  No apparent distress, appears stated age  Eyes: Pupils equal, round, reactive to light, conjunctiva/corneas clear  Ears/Nose/Mouth/Throat: No external lesions or scars, hearing intact to voice  Neck: Trachea midline, no masses noted  Respiratory:  Normal respiratory effort.  Clear to auscultation bilaterally  Cardiovascular: Regular rate and rhythm, nl S1/S2, w/o murmurs, rubs or gallops, no lower extremity edema  Abdomen: Soft, non-tender, non-distended, no hepatosplenomegaly  Musculoskeletal: No cyanosis, clubbing or petechiae, no lower extremity misalignment, asymmetry, or crepitation  Skin: Normal skin color, texture, turgor. No rashes or lesions noted. Psychiatric: Alert and oriented x4, good insight and judgment    Labs:   No results for input(s): WBC, HGB, HCT, PLT in the last 72 hours. Recent Labs     10/24/20  0430 10/25/20  0530 10/25/20  1520 10/26/20  0505   * 149*  --  147*   K 3.9 2.6* 4.6 3.0*   * 117*  --  114*   CO2 22 23  --  25   BUN 28* 22*  --  19   CREATININE 0.9 0.9  --  1.0   CALCIUM 7.7* 7.8*  --  8.2*     Recent Labs     10/23/20  1800 10/25/20  0530 10/26/20  0505   * 93* 59*   ALT 93* 57* 46*   BILIDIR <0.2 <0.2 <0.2   BILITOT 0.3 0.3 <0.2   ALKPHOS 97 85 90     No results for input(s): INR in the last 72 hours. No results for input(s): Joeline Reeks in the last 72 hours. Urinalysis:      Lab Results   Component Value Date    NITRU Negative 10/21/2020    WBCUA 3-5 10/21/2020    BACTERIA Rare 10/21/2020    RBCUA 21-50 10/21/2020    BLOODU LARGE 10/21/2020    SPECGRAV 1.025 10/21/2020    GLUCOSEU Negative 10/21/2020       Radiology:  XR CHEST 1 VIEW   Final Result   Marked interval progression of airspace disease as detailed above likely reflecting worsening pneumonia. XR CHEST PORTABLE   Final Result   1. Low lung volumes. 2.  Patchy opacities in the upper perihilar regions and scattered in the    right midlung zone and left lung base. May represent    infectious/inflammatory or neoplastic process. Recommend follow-   up/further workup. 3.  Left pleural effusion or thickening, especially along the    superolateral aspect.               Assessment/Plan:    Active Hospital Problems    Diagnosis Date Noted    Acute respiratory failure ([address] [J96.00] 10/21/2020    Shortness of breath [R06.02]     Advance care planning [Z71.89]        Plan:    # Acute respiratory failure w/ hypoxia  # COVID PNA  -Pulm following  -on remdesivir, decadron  -Continue Vancomycin, cefepime  -Watch LFTs    # DEBBY  # Hypernatremia  -nephrology following  -improving, check daily BMP    # Sacral ulcer  -wound care consult    # Hypokalemia  -repleted, resolved    # Schizophrenia, dementia  -SW following for dispo to SNF    DVT Prophylaxis: Lovenox  Diet: DIET DYSPHAGIA PUREED; Mildly Thick (Nectar)  Code Status: Limited    PT/OT Eval Status: ongoing    Dispo - inpt, dispo to SNF pending clinical improvement, O2 requirement    Caio Alvarado MD

## 2020-10-26 NOTE — PLAN OF CARE
Problem: Falls - Risk of:  Goal: Will remain free from falls  Description: Will remain free from falls  Outcome: Ongoing   Patient meets Isabela Broaden fall risk guidelines. Non skid footwear with ambulation. Bed in lowest position. Call light in reach. Bed alarm activated. Reminded to call for assistance before exiting bed. Continue safety precautions. Problem: Airway Clearance - Ineffective  Goal: Achieve or maintain patent airway  Outcome: Ongoing   Patient on Vapotherm at 40, 100% Sats 93-96%. Problem: Breathing Pattern - Ineffective  Goal: Ability to achieve and maintain a regular respiratory rate  Outcome: Ongoing   RR up to 36 at times. Sternal retractions, labored breathing with movement.      Problem: Isolation Precautions - Risk of Spread of Infection  Goal: Prevent transmission of infection  Outcome: Ongoing   Continue Droplet for Covid-19

## 2020-10-26 NOTE — PROGRESS NOTES
Clinical Pharmacy Progress Note    ADMIT DATE: 10/21/2020     Interval update: On day 4 of Remdesivir. Now on vapotherm at [address] [facility].    43 yo M with PMH of TBI, seizures, dementia, aphasia, schizophrenia who presented from NH with worsening SOB, AMS. Admitted with respiratory failure - likely from PNA vs COVID-19; also with DEBBY and diarrhea. Pharmacy has been consulted to dose vancomycin per Dr. Peter Rankin. PERTINENT MEDICATIONS:  Cefepime 2g IV q8h - day #6  Vancomycin - pharmacy to dose - day #6    10/21   (10/21-10/22)    (10/22-10/23)   1.5g IV q24h (10/24 - current)  Remdesivir 200mg IV x1, then 100mg IV q24h - day #4 of 5    Recent Labs     10/25/20  0530 10/25/20  1520 10/26/20  0505   *  --  147*   K 2.6* 4.6 3.0*   *  --  114*   CO2 23  --  25   BUN 22*  --  19   CREATININE 0.9  --  1.0   GLUCOSE 124*  --  145*     Estimated Creatinine Clearance: 96 mL/min (based on SCr of 1 mg/dL). No results for input(s): WBC, HGB, PLT in the last 72 hours. MICROBIOLOGY:  Blood (10/21) = NGTD x2  CDiff toxin/antigen (10/22) = Indeterminate   MRSA nasal PCR = Sent  COVID-19 (10/21) = Positive  Strep pneumo antigen (10/22) = negative  Legionella antigen (10/22) = negative    Vancomycin Levels:  Trough  10/24 @ 04:30 = 26.2mcg/mL (drawn ~1 hr early, on 1.25g IV q12h)    VITALS:  BP (!) 146/79   Pulse 98   Temp 97.7 °F (36.5 °C) (Oral)   Resp 24   Ht 6' 0.01\" (1.829 m)   Wt 198 lb (89.8 kg)   SpO2 96%   BMI 26.85 kg/m²     Intake/Output Summary (Last 24 hours) at 10/26/2020 1620  Last data filed at 10/26/2020 0502  Gross per 24 hour   Intake 32139.02 ml   Output 1925 ml   Net 09798.02 ml     PROPHYLAXIS:  Stress ulcer: not indicated  VTE:  Enoxaparin     ASSESSMENT/PLAN:  1)  Respiratory failure - PNA: Cefepime + Vancomycin - Day #5  · Vancomycin--pharmacy to dose  · Continue 1.5g IV q24h. Renal function stable. Dose adjusted on 10/24 based on trough of 26.2 mcg/mL on 1.25g IV q12h.   · Repeat trough will be ordered when appropriate. Target trough = 15-20 mcg/mL. · Clinical status will be monitored closely / dose will be adjusted as appropriate.      Please call with questions--  Thanks--  Vitor PalaciosD, BCPS  Wireless: [phone]  or [phone]  10/26/2020 9:07 AM

## 2020-10-26 NOTE — PROGRESS NOTES
Speech Language Pathology - Contact Note - No Treatment  Chart reviewed and apoke with RN, who notes increased O2 requirements but indicates that pt is tolerating PO without overt clinical s/s aspiration. Attempted to see pt for f/u dysphagia tx; however unable to wake pt (with verbal + tactile cues) to participate at this time. Will re-attempt at a later time/date. Please page ST department with any concerns.     Electronically Signed by:  Lizzy Medrano., Alice Baugh  Speech-Language Pathologist  [phone]  Pager #[phone]

## 2020-10-26 NOTE — PROGRESS NOTES
Patient Name: Sadie Salgado                                                    Primary Physician: Naman Jansen MD  Admitting Dx: Acute respiratory failure [facility]) [J96.00]  Acute respiratory failure [facility]) [J96.00]      Nephrology Hospital Progress Note  [facility]  [email or web address]                                       Interval Plan:     · Na now 147 on NS and change to D5W at 50 cc/hr. Not taking adequate fluids. · Renal function stable. · COVID +ve . · Replace potassium for hypokalemia. · Continue nifedipine XL for hypertension. Assessment:     Hypernatremia  · Likely fluid access related now on IVF NS and monitor for improvement in Na. · No need for aggressive workup at this time. · Na is only 148 on this admission and not nearly as high as in the past > 165.     · Previously from poor oral intake in the face of lithium use. Lithium can cause polyuria. Monitor renal daily  · Appears to be of Lithium and now on HCTZ which has been held as well likely contributing. Hx of DEBBY  ·  Baseline creatinine is 0.7 and now 0.9  · Hematuria: resolved  Resp Failure / COVID +ve  · On Vanc, Remdesivir, Deadron, and Cefepime. NO Zosyn / Vanc combination   · Now on 15 L NC  Hypokalemia  · ok at 3.9 and yesterday given 40 meq      Please call our office at [phone] or [employer] with any questions or contact me directly. Subjective:     CC / Reason for Consult:    Hypernatremia    HPI/PMH:.  Sadie Salgado is a 44 y.o. male admitted for Resp Failure / COVID +ve with PMHx of traumatic brain injury with encephalopathy and seizure disorder, schizophrenia/bipolar disorder. Previously seen for hypernatremia as well and not taking in fluids or eating and admitted at [facility] with similar issues with hypernatremia, DEBBY and pneumonia. Previously sodium was 167 and creatinine was 4.8 with BUN of 62. He was also hypotensive and receive IVF. Now Na is 148 and SCr is 0.9.   Patient is not

## 2020-10-26 NOTE — CARE COORDINATION
Case Management Assessment           Daily Note                 Date/ Time of Note: 10/26/2020 12:15 PM         Note completed by: Cristofer Hernandez    Patient Name: Jacki Gotti  YOB: 1970    Diagnosis:Acute respiratory failure ([facility] [J96.00]  Acute respiratory failure ([facility] [J96.00]  Patient Admission Status: Inpatient    Date of Admission:10/21/2020  1:57 AM Length of Stay: 5 GLOS:      Current Plan of Care: on vapotherm, not taking adequate fluids; COVID +  ________________________________________________________________________________________  PT AM-PAC:   / 24 per last evaluation on:     OT AM-PAC:   / 24 per last evaluation on:    DME Needs for discharge:   ________________________________________________________________________________________  Discharge Plan: return to [facility] long term care    Tentative discharge date: TBD    Current barriers to discharge: medical clearance    Referrals completed:     Resources/ information provided:   ________________________________________________________________________________________  Case Management Notes:  SW following Pt today - chart reviewed. Pt will be returning to [facility] long term care and can return although COVID +. No precert needed for return. Samreen Marinestelle and his family were provided with choice of provider; he and his family are in agreement with the discharge plan.     Care Transition Patient: [facility]  Case Management Department  Ph: [phone]

## 2020-10-26 NOTE — PROGRESS NOTES
Pulmonology PGY-1 Resident Progress Note        PCP: Michela Hayward MD    Date of Admission: 10/21/2020    Chief Complaint/Consult: Hypoxia and fever    Subjective:   No acute events overnight. Patient is still satting mid 90s on 40L Vapotherm at 95% FiO2 which has not increased since yesterday. CXR with worsened marked interval progression of airspace disease. Patient remains on vancomycin and cefepime, as well as decadron and remdesevir. Patient seen at the bedside this a.m. He is more alert, however, it is still very difficult to understand him. Pt denied any SOB, cough, CP, Chills, N/V. Medications:  Reviewed    Infusion Medications    dextrose 75 mL/hr at 10/26/20 0944    dextrose       Scheduled Medications    valproic acid  1,250 mg Oral BID    LORazepam  1 mg Oral BID    phenytoin  200 mg Oral BID    potassium chloride  40 mEq Oral Daily    NIFEdipine  30 mg Oral Daily    vancomycin  1,500 mg Intravenous Q24H    cefepime  2 g Intravenous Q8H    remdesivir IVPB  100 mg Intravenous Q24H    dexamethasone  6 mg Intravenous Q24H    enoxaparin  30 mg Subcutaneous BID    Venelex   Topical BID    sodium chloride flush  10 mL Intravenous 2 times per day    insulin lispro  0-12 Units Subcutaneous Q4H     PRN Meds: sodium chloride, prochlorperazine, labetalol, sodium chloride flush, acetaminophen **OR** acetaminophen, polyethylene glycol, promethazine **OR** [DISCONTINUED] ondansetron, glucose, dextrose, glucagon (rDNA), dextrose      Intake/Output Summary (Last 24 hours) at 10/26/2020 1553  Last data filed at 10/26/2020 1548  Gross per 24 hour   Intake 08473.02 ml   Output 2275 ml   Net 55520.02 ml       Physical Exam Performed:    BP (!) 158/83   Pulse 113   Temp 98.6 °F (37 °C) (Axillary)   Resp 22   Ht 6' 0.01\" (1.829 m)   Wt 198 lb (89.8 kg)   SpO2 90%   BMI 26.85 kg/m²     General appearance: Mild distress, appears stated age. On 15L NC.  HEENT: Pupils equal, round, and reactive to light. CHEST PORTABLE   Final Result   1. Low lung volumes. 2.  Patchy opacities in the upper perihilar regions and scattered in the    right midlung zone and left lung base. May represent    infectious/inflammatory or neoplastic process. Recommend follow-   up/further workup. 3.  Left pleural effusion or thickening, especially along the    superolateral aspect. Assessment/Plan:    Nolberto Mauro, 44 y.o. male w/ TBI, Schizophrenia, Bipolar disorder and seizures who presented with Fever and hypoxia from his care facility. Admitted for Acute hypoxic respiratory failure. Suspicion for PNA as etiology. Active Hospital Problems    Diagnosis Date Noted    Acute respiratory failure ([facility] [address] [J96.00] 10/21/2020    Shortness of breath [R06.02]     Advance care planning [Z71.89]        Acute hypoxic respiratory failure 2/2 COVID PNA  CXR and CT from 2017 clear, therefore unlikely chronic etiology. CXR is now more typical for COVID-19 with marked interval progression of airspace disease which is now more diffuse and widespread. - Now on Vapotherm 40L at 95% FiO2, and still satting mid 90s.  - O2 goal of >88%  - Would continue broad spectrum antibiotics Vanc and cefepime (day 6)  - Continue Decadron (day 5)  - Continue remdesevir (Tonight will be day 4)  - Will maintain current course  - Will continue to monitor closely    COVID positive      DVT Prophylaxis: Lovenox  Diet: Diet NPO Effective Now  Code Status: Limited  PT/OT Eval Status: Not consulted  Dispo - [facility]. > 2-day hospital stay    I will discuss the patient with Dr Alisa Saini MD  Internal Medicine Resident, PGY-1  Perfect Serve    Patient was seen, examined and discussed with Dr. Fatoumata Melissa. I agree with the interval history.  My physical exam confirms the findings listed below  Chart was reviewed including labs, CXR and medical records confirm the findings noted    Acute hypoxic respiratory failure due to COVID-19 pneumonia   CXR looks worse. He is on fairly high level of O2 supplement, however there is no renal failure or signs of organ hypoperfusion. Continue supportive care. Titrate FIO2 to keep sats > 88%. Lovenox.  Remdesivir

## 2020-10-27 NOTE — CARE COORDINATION
Patient remains on high levels of O2 and Palliative Care [facility]) spoke to patient's family concerning possible hospice. Palliative Care following this case. Pt will be returning to [facility] long term care and can return although COVID +. No precert needed for return. CM will continue to follow patient until discharge.   Electronically signed by Cindie Eisenmenger, RN on 10/27/2020 at 3:13 PM

## 2020-10-27 NOTE — PLAN OF CARE
Problem: Skin Integrity:  Goal: Will show no infection signs and symptoms  Description: Will show no infection signs and symptoms  Outcome: Ongoing     Problem: Skin Integrity:  Goal: Absence of new skin breakdown  Description: Absence of new skin breakdown  Outcome: Ongoing     Problem: Falls - Risk of:  Goal: Will remain free from falls  Description: Will remain free from falls  10/27/2020 0753 by Soco Dennison RN  Outcome: Ongoing     Problem: Falls - Risk of:  Goal: Absence of physical injury  Description: Absence of physical injury  10/27/2020 0753 by Soco Dennison RN  Outcome: Ongoing     Problem: Airway Clearance - Ineffective  Goal: Achieve or maintain patent airway  10/27/2020 0753 by Soco Dennison RN  Outcome: Ongoing     Problem: Gas Exchange - Impaired  Goal: Absence of hypoxia  10/27/2020 0753 by Soco Dennison RN  Outcome: Ongoing     Problem: Gas Exchange - Impaired  Goal: Promote optimal lung function  10/27/2020 0753 by Soco Dennison RN  Outcome: Ongoing     Problem: Breathing Pattern - Ineffective  Goal: Ability to achieve and maintain a regular respiratory rate  Outcome: Ongoing     Problem: Body Temperature -  Risk of, Imbalanced  Goal: Ability to maintain a body temperature within defined limits  Outcome: Ongoing     Problem: Body Temperature -  Risk of, Imbalanced  Goal: Will regain or maintain usual level of consciousness  Outcome: Ongoing     Problem:  Body Temperature -  Risk of, Imbalanced  Goal: Complications related to the disease process, condition or treatment will be avoided or minimized  Outcome: Ongoing     Problem: Isolation Precautions - Risk of Spread of Infection  Goal: Prevent transmission of infection  Outcome: Ongoing     Problem: Nutrition Deficits  Goal: Optimize nutrtional status  Outcome: Ongoing     Problem: Risk for Fluid Volume Deficit  Goal: Maintain normal heart rhythm  Outcome: Ongoing     Problem: Risk for Fluid Volume Deficit  Goal: Maintain absence of muscle cramping  Outcome: Ongoing     Problem: Risk for Fluid Volume Deficit  Goal: Maintain normal serum potassium, sodium, calcium, phosphorus, and pH  Outcome: Ongoing     Problem: Loneliness or Risk for Loneliness  Goal: Demonstrate positive use of time alone when socialization is not possible  Outcome: Ongoing     Problem: Fatigue  Goal: Verbalize increase energy and improved vitality  Outcome: Ongoing     Problem: Patient Education: Go to Patient Education Activity  Goal: Patient/Family Education  Outcome: Ongoing     Problem: Nutrition  Goal: Optimal nutrition therapy  Description: Nutrition Problem #1: Inadequate oral intake  Intervention: Food and/or Nutrient Delivery: Continue NPO  Nutritional Goals: pt will tolerate diet advancement       Outcome: Ongoing     Pt oxygen requirement increased to vapo 100% 40 L. Pt did not tolerate PO medications. Pt DTI did not show any signs of  improvement or further damage. Pt remained afebrile throughout the shift.

## 2020-10-27 NOTE — PROGRESS NOTES
Hospitalist Progress Note      PCP: Sagar Hartmann MD    Date of Admission: 10/21/2020    Chief Complaint: SOB    Subjective:  Patient seen and examined at the bedside. On 40L, 100%, saturating 91-92%. Somewhat confused today. Denies pain. PFHS: reviewed as documented 10/21/2020, no changes    Medications:  Reviewed    Infusion Medications    IV infusion builder      dextrose       Scheduled Medications    potassium chloride  40 mEq Oral Daily    valproic acid  1,250 mg Oral BID    LORazepam  1 mg Oral BID    phenytoin  200 mg Oral BID    potassium chloride  40 mEq Oral Daily    NIFEdipine  30 mg Oral Daily    vancomycin  1,500 mg Intravenous Q24H    cefepime  2 g Intravenous Q8H    remdesivir IVPB  100 mg Intravenous Q24H    dexamethasone  6 mg Intravenous Q24H    enoxaparin  30 mg Subcutaneous BID    Venelex   Topical BID    sodium chloride flush  10 mL Intravenous 2 times per day    insulin lispro  0-12 Units Subcutaneous Q4H     PRN Meds: sodium chloride, prochlorperazine, labetalol, sodium chloride flush, acetaminophen **OR** acetaminophen, polyethylene glycol, promethazine **OR** [DISCONTINUED] ondansetron, glucose, dextrose, glucagon (rDNA), dextrose      Intake/Output Summary (Last 24 hours) at 10/27/2020 0845  Last data filed at 10/27/2020 2642  Gross per 24 hour   Intake 280 ml   Output 2175 ml   Net -1895 ml         Physical Exam Performed:    BP (!) 157/88   Pulse 111   Temp 98.1 °F (36.7 °C) (Axillary)   Resp 20   Ht 6' 0.01\" (1.829 m)   Wt 198 lb (89.8 kg)   SpO2 93%   BMI 26.85 kg/m²     General appearance:  No apparent distress, appears stated age  Eyes: Pupils equal, round, reactive to light, conjunctiva/corneas clear  Ears/Nose/Mouth/Throat: No external lesions or scars, hearing intact to voice  Neck: Trachea midline, no masses noted  Respiratory:  Normal respiratory effort.  Clear to auscultation bilaterally  Cardiovascular: Regular rate and rhythm, nl S1/S2, w/o murmurs, rubs or gallops, no lower extremity edema  Abdomen: Soft, non-tender, non-distended, no hepatosplenomegaly  Musculoskeletal: No cyanosis, clubbing or petechiae, no lower extremity misalignment, asymmetry, or crepitation  Skin: Normal skin color, texture, turgor. No rashes or lesions noted. Psychiatric: Awake, not able to verbalize much, does nod to yes/no questions    Labs:   No results for input(s): WBC, HGB, HCT, PLT in the last 72 hours. Recent Labs     10/25/20  0530 10/25/20  1520 10/26/20  0505 10/27/20  0640   *  --  147* 145   K 2.6* 4.6 3.0* 2.8*   *  --  114* 114*   CO2 23  --  25 23   BUN 22*  --  19 18   CREATININE 0.9  --  1.0 0.9   CALCIUM 7.8*  --  8.2* 7.8*     Recent Labs     10/25/20  0530 10/26/20  0505   AST 93* 59*   ALT 57* 46*   BILIDIR <0.2 <0.2   BILITOT 0.3 <0.2   ALKPHOS 85 90     No results for input(s): INR in the last 72 hours. No results for input(s): Destin Hug in the last 72 hours. Urinalysis:      Lab Results   Component Value Date    NITRU Negative 10/21/2020    WBCUA 3-5 10/21/2020    BACTERIA Rare 10/21/2020    RBCUA 21-50 10/21/2020    BLOODU LARGE 10/21/2020    SPECGRAV 1.025 10/21/2020    GLUCOSEU Negative 10/21/2020       Radiology:  XR CHEST 1 VIEW   Final Result   Marked interval progression of airspace disease as detailed above likely reflecting worsening pneumonia. XR CHEST PORTABLE   Final Result   1. Low lung volumes. 2.  Patchy opacities in the upper perihilar regions and scattered in the    right midlung zone and left lung base. May represent    infectious/inflammatory or neoplastic process. Recommend follow-   up/further workup. 3.  Left pleural effusion or thickening, especially along the    superolateral aspect.               Assessment/Plan:    Active Hospital Problems    Diagnosis Date Noted    Acute respiratory failure ([facility] [address] [J96.00] 10/21/2020    Shortness of breath [R06.02]     Advance care planning [Z71.89] Plan:    # Acute respiratory failure w/ hypoxia  # COVID PNA  -Pulm following  -on remdesivir, decadron  -Continue Vancomycin, cefepime  -Watch LFTs    # DEBBY  # Hypernatremia  -nephrology following  -improving, check daily BMP     # Sacral ulcer  -wound care consult     # Hypokalemia  -repleted, resolved     # Schizophrenia, dementia  -SW following for dispo to SNF    DVT Prophylaxis: Lovenox  Diet: DIET DYSPHAGIA PUREED; Mildly Thick (Nectar)  Code Status: Limited    PT/OT Eval Status: ongoing    Dispo - inpt, dispo to LTC pending clinical improvement, O2 requirement    Oc Ngo MD

## 2020-10-27 NOTE — PLAN OF CARE
Problem: Skin Integrity:  Goal: Will show no infection signs and symptoms  Description: Will show no infection signs and symptoms  10/27/2020 1932 by Spenecr Campbell RN  Outcome: Ongoing   Rectal tube inserted this shift due to unstageable coccyx wound and frequent bowel movements.  Venelex being applied to buttocks frequently
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
## 2020-10-27 NOTE — PROGRESS NOTES
Speech Language Pathology  Facility/Department: [place] [address] [facility]  Dysphagia Daily Treatment Note    NAME: Justina Savage  : 1970  MRN: [id number]    Patient Diagnosis(es):   Patient Active Problem List    Diagnosis Date Noted    Acute respiratory failure ([address] 10/21/2020    Shortness of breath     Advance care planning     Encounter for palliative care     Dysphagia     Acute encephalopathy 2015    Acute hypernatremia     Severe dehydration     Hypernatremia 2015    Sepsis ([address] 2015    Acute respiratory failure with hypoxia ([address] 2015    Seizure disorder ([address] 2015    Traumatic brain injury ([facility] [address] 2015    Diabetes insipidus ([address] 2015    DEBBY (acute kidney injury) ([facility] [address] 2015    Thrombocytopenia ([address] 2014    Encephalopathy 2013     Allergies: Allergies   Allergen Reactions    Codeine     Lactose      sensitivity     Onset Date:  10/21/2020  Current Diet Level:  Diet Solids: Dysphagia Pureed (Dysphagia I), Liquid Consistency: Mildly Thick (Nectar)    Pain:  Pain Assessment  Pain Assessment: Advanced Dementia  Pain Level: 0  Zarate-Baker Pain Rating: No hurt  PAINAD (Pain Assessment in Advance Dementia)  Breathing: occasional labored breathing, short period of hyperventilation  Negative Vocalization: occasional moan/groan, low speech, negative/disapproving quality  Facial Expression: smiling or inexpressive  Body Language: relaxed  Consolability: no need to console  PAINAD Score: 2    Chart reviewed. Medical Diagnosis:  Acute respiratory failure  Treatment Diagnosis:  Oral-pharyngeal Dysphagia (R13.12)    BSE Impression (10/23/20): Patient presents with suspected mild-moderate oropharyngeal dysphagia. Pt with intermittent anterior labial loss of liquids on left, reduced oral control/prolonged oral prep stage, positive laryngeal movement with PO intake, no overt signs of aspiration/penetration.  Given history of aspiration/deep penetration of thin liquids and current increased O2 demands, recommend nectar thick liquids via tsp and pureed solids, utilizing aspiration precuations and frequent oral hygeine. Will continue to follow. MBS results:  No recent MBS    Previous MBS results:    5/14/2015:    Pt with functional mastication for soft items, no oral residue noted. Will initiate with dysphagia II and can upgrade from there as pt tolerates  Mild - mod pharyngeal phase dysphagia. Moderate amount of mod/deep penetration occurred with thin liquids, during the swallow, no cough noted, not able to produce strong cough on request.   One instance of min penetration from residue in valleculae that cleared with nectar. No other penetration occurred with subsequent trials of nectar by tsp/cup/straw or with puree/honey. Min- mod amount of residue in valleculae noted. Pain: none reported    Current Diet:  Diet Solids: Dysphagia Pureed (Dysphagia I), Liquid Consistency: Mildly Thick (Nectar)    Treatment:  Pt seen bedside to address the following goals:  Goal 1: Patient will tolerate least restrictive diet without overt signs of aspiration. 10/27/20:  Pt alert and eager to consume PO trials provided. Speech mostly unintelligible. Pt is able to tolerate half-teaspoon puree 3x with no immediate clinical s/s aspiration; however pt does demonstrate DELAYED strong wet cough. Pt does have some coughing prior to PO trials. RN reports that pt demonstrated immediate cough response with applesauce this morning. Pt is somewhat impulsive with PO and liquids provided via straw. Pt demonstrates subjectively increased respiratory rate and audible wheezing following nectar consistency liquids. Recommend NPO at this time. Continue goal.    Goal 2: Patient will tolerate repeat instrumental swallow procedure as appropriate. 10/27/20:  Pt remains in isolation 2/2 COVID(+) at this time. Continue goal when pt released from isolation precautions. Goal 3: Patient/caregiver will demonstrate understanding of swallowing concerns/recommendations. 10/23: Addressed; educated patient to purpose of visit, dysphagia, aspiration, previous MBS, swallow diet recommendations, importance of upright position. With with some understanding, needs reinforcement. 10/27/20:  SLP attempted to educate pt re: role of SLP, s/s aspiration noted, risks of aspiration, and NPO recommendations. Pt unable to demonstrates comprehension. Patient/Family/Caregiver Education:  SLP educated pt re: role of SLP, s/s aspiration noted, and diet/POC recommendations. Pt needs reinforcement. Compensatory Strategies:  Aggressive Oral Care     Plan:  Continue dysphagia treatment/reassessment   Diet recommendations:  NPO  DC recommendation: TBD  Treatment: 15 minutes  D/W nursingSofia  Needs met prior to leaving room, call button in reach. Electronically Signed by:  Hazel Rahman., [id number] [facility]  Speech-Language Pathologist  [phone]  Pager #[phone]    If patient is discharged prior to next treatment, this note will serve as the discharge summary.

## 2020-10-27 NOTE — PROGRESS NOTES
Clinical Pharmacy Progress Note    ADMIT DATE: 10/21/2020     Interval update:  CXR 10/26 showed worsening progression of airspace disease. Day #5 of Remdesivir. 45 yo M with PMH of TBI, seizures, dementia, aphasia, schizophrenia who presented from NH with worsening SOB, AMS. Admitted with respiratory failure - likely from PNA vs COVID-19; also with DEBBY and diarrhea. Pharmacy has been consulted to dose vancomycin per Dr. Aniyah Chadwick. PERTINENT MEDICATIONS:  Cefepime 2g IV q8h - day #7  Vancomycin - pharmacy to dose - day #7    10/21   (10/21-10/22)    (10/22-10/23)   1.5g IV q24h (10/24 - current)  Remdesivir 200mg IV x1, then 100mg IV q24h - day #5 of 5    Recent Labs     10/26/20  0505 10/27/20  0640   * 145   K 3.0* 2.8*   * 114*   CO2 25 23   BUN 19 18   CREATININE 1.0 0.9   GLUCOSE 145* 135*     Estimated Creatinine Clearance: 107 mL/min (based on SCr of 0.9 mg/dL). No results for input(s): WBC, HGB, PLT in the last 72 hours. MICROBIOLOGY:  Blood (10/21) = NGTD x2  CDiff toxin/antigen (10/22) = Indeterminate   MRSA nasal PCR = Sent  COVID-19 (10/21) = Positive  Strep pneumo antigen (10/22) = negative  Legionella antigen (10/22) = negative    Vancomycin Levels:  Trough   10/24 @ 04:30 = 26.2mcg/mL (drawn ~1 hr early, on 1.25g IV q12h)    VITALS:  BP (!) 157/88   Pulse 111   Temp 98.1 °F (36.7 °C) (Axillary)   Resp 24   Ht 6' 0.01\" (1.829 m)   Wt 198 lb (89.8 kg)   SpO2 92%   BMI 26.85 kg/m²     Intake/Output Summary (Last 24 hours) at 10/27/2020 0795  Last data filed at 10/27/2020 7798  Gross per 24 hour   Intake 280 ml   Output 2175 ml   Net -1895 ml     PROPHYLAXIS:  Stress ulcer: not indicated  VTE:  Enoxaparin     ASSESSMENT/PLAN:  1)  Respiratory failure - PNA: Cefepime + Vancomycin - Day #7  · Vancomycin--pharmacy to dose  · Continue 1.5g IV q24h. Renal function stable. Dose adjusted on 10/24 based on trough of 26.2 mcg/mL on 1.25g IV q12h.   · Repeat trough ordered for this evening, prior to fourth dose on current regimen. Target trough = 15-20 mcg/mL. · Clinical status will be monitored closely / dose will be adjusted as appropriate.      Please call with questions--  Thanks--  Willy Hastings PharmD, BCPS  Wireless: [id number]  or [phone]  10/27/2020 7:29 AM
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
## 2020-10-27 NOTE — PLAN OF CARE
Nutrition Problem #1: Inadequate oral intake  Intervention: Food and/or Nutrient Delivery: Continue NPO  Nutritional Goals: Pt will tolerate the most appropriate form of nutrition therapy this admission

## 2020-10-27 NOTE — PROGRESS NOTES
Pt family member (Hui Gale son) contacted and all questions answered.  Electronically signed by Prashant Sharif RN on 10/27/2020 at 8:06 AM

## 2020-10-27 NOTE — PROGRESS NOTES
Patient Name: Thad Yu                                                    Primary Physician: Edelmira Early MD  Admitting Dx: Acute respiratory failure [facility]) [J96.00]  Acute respiratory failure [facility]) [J96.00]      Nephrology Hospital Progress Note  [facility]  [email or web address]                                       Interval Plan:     · Na now 145 on D5W + KCL at 75 cc/hr. Not taking adequate fluids. · Renal function stable. · COVID +ve . · Replace potassium for hypokalemia. · Continue Nifedipine XL for hypertension. · Urine output is 2.1 L. Assessment:     Hypernatremia  · Likely fluid access related now on IVF NS and monitor for improvement in Na. · No need for aggressive workup at this time. · Na is only 148 on this admission and not nearly as high as in the past > 165.     · Previously from poor oral intake in the face of lithium use. Lithium can cause polyuria. Monitor renal daily  · Appears to be of Lithium and now on HCTZ which has been held as well likely contributing. Hx of DEBBY  ·  Baseline creatinine is 0.7 and now 0.9  · Hematuria: resolved  Resp Failure / COVID +ve  · On Vanc, Remdesivir, Deadron, and Cefepime. NO Zosyn / Vanc combination   · Now on 15 L NC  Hypokalemia  · ok at 3.9 and yesterday given 40 meq      Please call our office at [phone] or [employer] with any questions or contact me directly. Subjective:     CC / Reason for Consult:    Hypernatremia    HPI/PMH:.  Thad Yu is a 44 y.o. male admitted for Resp Failure / COVID +ve with PMHx of traumatic brain injury with encephalopathy and seizure disorder, schizophrenia/bipolar disorder. Previously seen for hypernatremia as well and not taking in fluids or eating and admitted at [facility] with similar issues with hypernatremia, DEBBY and pneumonia. Previously sodium was 167 and creatinine was 4.8 with BUN of 62. He was also hypotensive and receive IVF. Now Na is 148 and SCr is 0.9.   Patient is not responsive and unable to provide history. ROS / Interval Hx: Patient seen in room with no changes. Denies difficulty breathing. Difficult to follow. Objective:        Gen: alert, well appearing, and in no distress     Neck:  supple, no significant adenopathy     Cardio: normal rate, regular rhythm, normal S1, S2, no murmurs, rubs, clicks or gallops      Resp: clear to auscultation, no wheezes, rales or rhonchi, symmetric air entry. GI:  soft, nontender, nondistended, no masses or organomegaly. Ext:  peripheral pulses normal, no pedal edema, no clubbing or cyanosis      MS: no joint tenderness, deformity or swelling      DERM: normal coloration and turgor, no rashesor bruising    Vitals:     BP (!) 157/88   Pulse 111   Temp 98.1 °F (36.7 °C) (Axillary)   Resp 24   Ht 6' 0.01\" (1.829 m)   Wt 198 lb (89.8 kg)   SpO2 92%   BMI 26.85 kg/m²      I/Os: Reviewed    Meds: Reviewed. Please see plan for changes made.     Labs:    Lab Results   Component Value Date    WBC 10.3 10/21/2020    HGB 13.3 10/21/2020    HCT 40.0 10/21/2020    MCV 87.2 10/21/2020     10/21/2020      Lab Results   Component Value Date    CREATININE 0.9 10/27/2020    BUN 18 10/27/2020     10/27/2020    K 2.8 10/27/2020    K 4.3 10/21/2020     10/27/2020    CO2 23 10/27/2020     No components found for: GLU   Lab Results   Component Value Date    CALCIUM 7.8 10/27/2020    CAION 1.21 04/29/2015    PHOS 3.1 05/16/2015      No results found for: [id number]   Lab Results   Component Value Date    IRON 46 05/01/2015    TIBC 158 05/01/2015    FERRITIN 6,802.0 10/24/2020      No results found for: Tauna Noun

## 2020-10-28 NOTE — PROGRESS NOTES
Pulmonology PGY-1 Resident Progress Note        PCP: Peter Mayorga MD    Date of Admission: 10/21/2020    Chief Complaint/Consult: Hypoxia and fever    Subjective:   Patient is now satting mid 90s on 40L Vapotherm at 100% FiO2. Patient completed course of vancomycin and cefepime, as well as remdesevir. Decadron day 7. Patient seen at the bedside this a.m. He is fully a&o. Pt denied any SOB, cough, CP, Chills, N/V or any other symptoms. Medications:  Reviewed    Infusion Medications    dextrose 5 % with KCl 20 mEq 75 mL/hr (10/28/20 0850)    dextrose       Scheduled Medications    potassium chloride  40 mEq Oral Daily    fosphenytoin (CEREBYX) maintenance dose IVPB  100 mg PE Intravenous Q6H    valproate sodium (DEPACON) IVPB  625 mg Intravenous Q6H    LORazepam  1 mg Intravenous BID    NIFEdipine  30 mg Oral Daily    dexamethasone  6 mg Intravenous Q24H    enoxaparin  30 mg Subcutaneous BID    Venelex   Topical BID    sodium chloride flush  10 mL Intravenous 2 times per day    insulin lispro  0-12 Units Subcutaneous Q4H     PRN Meds: sodium chloride, prochlorperazine, labetalol, sodium chloride flush, acetaminophen **OR** acetaminophen, polyethylene glycol, promethazine **OR** [DISCONTINUED] ondansetron, glucose, dextrose, glucagon (rDNA), dextrose      Intake/Output Summary (Last 24 hours) at 10/28/2020 1018  Last data filed at 10/28/2020 0316  Gross per 24 hour   Intake 0 ml   Output 1620 ml   Net -1620 ml       Physical Exam Performed:    BP (!) 149/78   Pulse 113   Temp 98.9 °F (37.2 °C) (Axillary)   Resp 26   Ht 6' 0.01\" (1.829 m)   Wt 198 lb (89.8 kg)   SpO2 92%   BMI 26.85 kg/m²     General appearance: Mild distress, appears stated age. On Vapotherm 40L at 100% FiO2. HEENT: Pupils equal, round, and reactive to light. Conjunctivae/corneas clear. Neck: Supple, with full range of motion. No jugular venous distention. Trachea midline. Respiratory: Mildly increased respiratory effort.   No longer tachypneic. Crackles appreciated bilaterally in the upper chest, more in the upper left without wheezes. Cardiovascular: Regular rate and rhythm with normal S1/S2 without murmurs, rubs or gallops. Abdomen: Soft, non-tender, non-distended with normal bowel sounds. Musculoskeletal: No clubbing, cyanosis or edema bilaterally. Full range of motion without deformity. Trace pitting edema in the lower extremities bilaterally  Skin: Skin color, texture, turgor normal.  No rashes or lesions. Neurologic: Unable to assess secondary to patient's baseline mentation  Psychiatric: A&O x3  Capillary Refill: Brisk,< 3 seconds   Peripheral Pulses: +2 palpable, equal bilaterally       Labs:   No results for input(s): WBC, HGB, HCT, PLT in the last 72 hours. Recent Labs     10/25/20  1520 10/26/20  0505 10/27/20  0640   NA  --  147* 145   K 4.6 3.0* 2.8*   CL  --  114* 114*   CO2  --  25 23   BUN  --  19 18   CREATININE  --  1.0 0.9   CALCIUM  --  8.2* 7.8*     Recent Labs     10/26/20  0505   AST 59*   ALT 46*   BILIDIR <0.2   BILITOT <0.2   ALKPHOS 90     No results for input(s): INR in the last 72 hours. No results for input(s): Karen Comment in the last 72 hours. Urinalysis:      Lab Results   Component Value Date    NITRU Negative 10/21/2020    WBCUA 3-5 10/21/2020    BACTERIA Rare 10/21/2020    RBCUA 21-50 10/21/2020    BLOODU LARGE 10/21/2020    SPECGRAV 1.025 10/21/2020    GLUCOSEU Negative 10/21/2020       Radiology:  XR CHEST 1 VIEW   Final Result   Marked interval progression of airspace disease as detailed above likely reflecting worsening pneumonia. XR CHEST PORTABLE   Final Result   1. Low lung volumes. 2.  Patchy opacities in the upper perihilar regions and scattered in the    right midlung zone and left lung base. May represent    infectious/inflammatory or neoplastic process. Recommend follow-   up/further workup.    3.  Left pleural effusion or thickening, especially along the superolateral aspect. Assessment/Plan:    Mario Chapa, 44 y.o. male w/ TBI, Schizophrenia, Bipolar disorder and seizures who presented with Fever and hypoxia from his care facility. Admitted for Acute hypoxic respiratory failure. Suspicion for COVID 19 PNA as etiology. Active Hospital Problems    Diagnosis Date Noted    Acute respiratory failure ([facility] [J96.00] 10/21/2020    Shortness of breath [R06.02]     Advance care planning [Z71.89]     Acute respiratory failure with hypoxia ([facility] [J96.01] 04/19/2015       Acute hypoxic respiratory failure 2/2 COVID-19 PNA  CXR and CT from 2017 clear, therefore unlikely chronic etiology. CXR is now more typical for COVID-19 with marked interval progression of airspace disease which is now more diffuse and widespread. - Now on Vapotherm 40L at 100% FiO2, and now satting mid to low 90s.  - O2 goal of >88%  - Completed 7-day course of Vanc and cefepime yesterday  - Completed 5-day course of remdesevir  - Continue Decadron (day 7)  - Inflammatory markers all trending down:  from 480, ferritin 1688 down from 6800, D-dimer 453 from 968. Jt Sumaya maintain current course  - Will continue to monitor closely    COVID positive      DVT Prophylaxis: Lovenox  Diet: Diet NPO Effective Now  Code Status: Limited  PT/OT Eval Status: Not consulted  Dispo - GM[facility]. > 2-day hospital stay    Discussed the patient with Dr Sofía Michel MD  Internal Medicine Resident, PGY-1  [facility] [employer]    Patient discussed with Dr. Eun Pisano. I agree with the interval history. My physical exam confirms the findings listed below  Chart was reviewed including labs and medical records confirm the findings noted     Acute hypoxic respiratory failure due to COVID19 pneumonia   Remain on vapotherm at 100% and 40L  Finished 7 days course of ABX  Agree on the A/P noted above.

## 2020-10-28 NOTE — PLAN OF CARE
pH  Outcome: Ongoing     Problem: Loneliness or Risk for Loneliness  Goal: Demonstrate positive use of time alone when socialization is not possible  Outcome: Ongoing     Problem: Fatigue  Goal: Verbalize increase energy and improved vitality  Outcome: Ongoing     Problem: Patient Education: Go to Patient Education Activity  Goal: Patient/Family Education  Outcome: Ongoing     Problem: Nutrition  Goal: Optimal nutrition therapy  Description: Nutrition Problem #1: Inadequate oral intake  Intervention: Food and/or Nutrient Delivery: Continue NPO  Nutritional Goals: pt will tolerate diet advancement       Outcome: Ongoing     Pt oxygen requirement remained stable. Pt vitals were unremarkable. Pt was afebrile throughout the night. Pt had little output of watery stool in rectal tube. Pt had adequate output in reynolds catheter. Pt received 4 bags of potassium chloride replacement. Pt DTI on bottom continued to have sanguinous and yellow fluid drainage. Treated with turns and venelex.

## 2020-10-28 NOTE — PROGRESS NOTES
Now Na is 148 and SCr is 0.9. Patient is not responsive and unable to provide history. ROS / Interval Hx: Patient seen in room with no changes. Denies difficulty breathing. Difficult to follow. Objective:        Gen: alert, well appearing, and in no distress     Neck:  supple, no significant adenopathy     Cardio: normal rate, regular rhythm, normal S1, S2, no murmurs, rubs, clicks or gallops      Resp: clear to auscultation, no wheezes, rales or rhonchi, symmetric air entry. GI:  soft, nontender, nondistended, no masses or organomegaly. Ext:  peripheral pulses normal, no pedal edema, no clubbing or cyanosis      MS: no joint tenderness, deformity or swelling      DERM: normal coloration and turgor, no rashesor bruising    Vitals:     BP (!) 149/78   Pulse 113   Temp 98.9 °F (37.2 °C) (Axillary)   Resp 26   Ht 6' 0.01\" (1.829 m)   Wt 198 lb (89.8 kg)   SpO2 90%   BMI 26.85 kg/m²      I/Os: Reviewed    Meds: Reviewed. Please see plan for changes made.     Labs:    Lab Results   Component Value Date    WBC 10.3 10/21/2020    HGB 13.3 10/21/2020    HCT 40.0 10/21/2020    MCV 87.2 10/21/2020     10/21/2020      Lab Results   Component Value Date    CREATININE 0.9 10/27/2020    BUN 18 10/27/2020     10/27/2020    K 2.8 10/27/2020    K 4.3 10/21/2020     10/27/2020    CO2 23 10/27/2020     No components found for: GLU   Lab Results   Component Value Date    CALCIUM 7.8 10/27/2020    CAION 1.21 04/29/2015    PHOS 3.1 05/16/2015      No results found for: [id number]   Lab Results   Component Value Date    IRON 46 05/01/2015    TIBC 158 05/01/2015    FERRITIN 6,802.0 10/24/2020      No results found for: Lorrane Maizes

## 2020-10-28 NOTE — PROGRESS NOTES
Pt family member Gigi Kimble - Son) updated and notified of pt status and plan of care.  Electronically signed by Nat Wynn RN on 10/28/2020 at 8:11 AM

## 2020-10-28 NOTE — PROGRESS NOTES
Hospitalist Progress Note      PCP: Kevin Bello MD    Date of Admission: 10/21/2020    Chief Complaint: SOB    Subjective:  Patient seen and examined at the bedside. Remains on high flow at 100% and 40L. PFHS: reviewed as documented 10/21/2020, no changes    Medications:  Reviewed    Infusion Medications    dextrose 5 % with KCl 20 mEq 75 mL/hr (10/28/20 0850)    dextrose       Scheduled Medications    potassium chloride  40 mEq Oral Daily    fosphenytoin (CEREBYX) maintenance dose IVPB  100 mg PE Intravenous Q6H    valproate sodium (DEPACON) IVPB  625 mg Intravenous Q6H    LORazepam  1 mg Intravenous BID    NIFEdipine  30 mg Oral Daily    dexamethasone  6 mg Intravenous Q24H    enoxaparin  30 mg Subcutaneous BID    Venelex   Topical BID    sodium chloride flush  10 mL Intravenous 2 times per day    insulin lispro  0-12 Units Subcutaneous Q4H     PRN Meds: sodium chloride, prochlorperazine, labetalol, sodium chloride flush, acetaminophen **OR** acetaminophen, polyethylene glycol, promethazine **OR** [DISCONTINUED] ondansetron, glucose, dextrose, glucagon (rDNA), dextrose      Intake/Output Summary (Last 24 hours) at 10/28/2020 0853  Last data filed at 10/28/2020 0316  Gross per 24 hour   Intake 0 ml   Output 1620 ml   Net -1620 ml         Physical Exam Performed:    BP (!) 149/78   Pulse 113   Temp 98.9 °F (37.2 °C) (Axillary)   Resp 26   Ht 6' 0.01\" (1.829 m)   Wt 198 lb (89.8 kg)   SpO2 90%   BMI 26.85 kg/m²     General appearance:  No apparent distress, appears stated age  Eyes: Pupils equal, round, reactive to light, conjunctiva/corneas clear  Ears/Nose/Mouth/Throat: No external lesions or scars, hearing intact to voice  Neck: Trachea midline, no masses noted  Respiratory:  Normal respiratory effort.  Clear to auscultation bilaterally  Cardiovascular: Regular rate and rhythm, nl S1/S2, w/o murmurs, rubs or gallops, no lower extremity edema  Abdomen: Soft, non-tender, non-distended, no hepatosplenomegaly  Musculoskeletal: No cyanosis, clubbing or petechiae, no lower extremity misalignment, asymmetry, or crepitation  Skin: Normal skin color, texture, turgor. No rashes or lesions noted. Psychiatric: Awake, not able to verbalize much, does nod to yes/no questions    Labs:   No results for input(s): WBC, HGB, HCT, PLT in the last 72 hours. Recent Labs     10/25/20  1520 10/26/20  0505 10/27/20  0640   NA  --  147* 145   K 4.6 3.0* 2.8*   CL  --  114* 114*   CO2  --  25 23   BUN  --  19 18   CREATININE  --  1.0 0.9   CALCIUM  --  8.2* 7.8*     Recent Labs     10/26/20  0505   AST 59*   ALT 46*   BILIDIR <0.2   BILITOT <0.2   ALKPHOS 90     No results for input(s): INR in the last 72 hours. No results for input(s): Bebeto Beery in the last 72 hours. Urinalysis:      Lab Results   Component Value Date    NITRU Negative 10/21/2020    WBCUA 3-5 10/21/2020    BACTERIA Rare 10/21/2020    RBCUA 21-50 10/21/2020    BLOODU LARGE 10/21/2020    SPECGRAV 1.025 10/21/2020    GLUCOSEU Negative 10/21/2020       Radiology:  XR CHEST 1 VIEW   Final Result   Marked interval progression of airspace disease as detailed above likely reflecting worsening pneumonia. XR CHEST PORTABLE   Final Result   1. Low lung volumes. 2.  Patchy opacities in the upper perihilar regions and scattered in the    right midlung zone and left lung base. May represent    infectious/inflammatory or neoplastic process. Recommend follow-   up/further workup. 3.  Left pleural effusion or thickening, especially along the    superolateral aspect.               Assessment/Plan:    Active Hospital Problems    Diagnosis Date Noted    Acute respiratory failure ([address] [J96.00] 10/21/2020    Shortness of breath [R06.02]     Advance care planning [Z71.89]     Acute respiratory failure with hypoxia ([address] [J96.01] 04/19/2015       Plan:    # Acute respiratory failure w/ hypoxia  # COVID PNA  -Pulm following  -s/p remdesivir x5d  -s/p vancomycin, cefepime x7d  -continue decadron  -Palliative care following, continue aggressive care     # DEBBY  # Hypernatremia  -nephrology following  -improving, check daily BMP     # Sacral ulcer  -wound care consult     # Hypokalemia  -repleted, resolved     # Schizophrenia, dementia  -SW following for dispo to SNF    DVT Prophylaxis: Lovenox  Diet: Diet NPO Effective Now  Code Status: Limited    PT/OT Eval Status: ongoing    Dispo - inpt, dispo to LTC pending clinical improvement, O2 requirement    Gurinder Cota MD

## 2020-10-28 NOTE — PROGRESS NOTES
Palliative Care Chart Review  and Check in Note:     NAME:  Alexi Lopez Date: 10/21/2020  Hospital Day:  Hospital Day: 8   Current Code status: Limited    Palliative care is continuing to following Mr. Hurtado for symptom management,  and goals of care discussion as needed. Patient's chart reviewed today 10/28/20. Pt's NATALY Lopez called this morning, upset that hospice was mentioned again yesterday. Provided brief update. Explained that the pt is still requiring very high levels of O2. Jessica asks why we have not tried hydroxychloroquine. The following are the currently established goals/code status, and Symptom management.      Goals of care: Continue with current management    Code status: DNR/DNI    Discharge plan: TBD pending hospital course      Cheri Saint [facility]  Inpatient Palliative Care  [phone]

## 2020-10-28 NOTE — CARE COORDINATION
Case Management Assessment           Daily Note                 Date/ Time of Note: 10/28/2020 10:07 AM         Note completed by: Richy Limon    Patient Name: Matt Addison  YOB: 1970    Diagnosis:Acute respiratory failure ([address] [J96.00]  Acute respiratory failure ([facility] [J96.00]  Acute respiratory failure with hypoxia ([facility] [J96.01]  Patient Admission Status: Inpatient    Date of Admission:10/21/2020  1:57 AM Length of Stay: 7 GLOS:      Current Plan of Care: 40L vapotherm, IV abx, decadron and remdesivir  ________________________________________________________________________________________  PT AM-PAC:   / 24 per last evaluation on:     OT AM-PAC:   / 24 per last evaluation on:     DME Needs for discharge: defer  ________________________________________________________________________________________  Discharge Plan: [address] [place] [address] ([facility]): [address] [place] [address]    Tentative discharge date: TBD    Current barriers to discharge: medical clearance    Referrals completed: Not Applicable    Resources/ information provided: Not indicated at this time  ________________________________________________________________________________________  Case Management Notes:  Patient is from [facility] at [address] [place] [address], family wants to keep fighting, not wanting to talk about hopsice. Patient can return to LTC when medically stable. Lizzie Pierre and his family were provided with choice of provider; he and his family are in agreement with the discharge plan.     Care Transition Patient: Leah Limon RN  [facility]  Case Management Department  Ph: [phone]  Fax: [phone]

## 2020-10-28 NOTE — PROGRESS NOTES
Physician Progress Note      Joseph Pacheco  [facility] #:                  [id number]  :                       1970  ADMIT DATE:       10/21/2020 1:57 AM  DISCH DATE:  RESPONDING  PROVIDER #:        Brigida Collins MD          QUERY TEXT:    Patient admitted with Sepsis d/t COVID-19. Per H&P and progress notes \"Sacral   ulcer\"; \"DTI\" documented per nursing. If possible, please document in progress   notes and discharge summary the type of ulcer. The medical record reflects the following:  Risk Factors: decreased mobility  Clinical Indicators: Per nursing admission assessement: DTI with excoriation;   Per H&P \"Sacral ulcer\"; Per nursing dependent on ADL's, does <25%. Treatment: Frequent repositioning, Venelex, rectal tube for frequent BMs  Options provided:  -- Decubitus Pressure Ulcer to sacrum POA  -- Other - I will add my own diagnosis  -- Disagree - Not applicable / Not valid  -- Disagree - Clinically unable to determine / Unknown  -- Refer to Clinical Documentation Reviewer    PROVIDER RESPONSE TEXT:    This patient has a decubitus pressure ulcer to sacrum that was present on   admission. Query created by:  Osiris Heck on 10/28/2020 12:41 PM      Electronically signed by:  Brigida Collins MD 10/28/2020 12:43 PM

## 2020-10-28 NOTE — PROGRESS NOTES
Palliative Care Chart Review  and Check in Note:     NAME:  Alexi Lopez Date: 10/21/2020  Hospital Day:  Hospital Day: 8   Current Code status: Limited    Palliative care is continuing to following Mr. Hurtado for symptom management,  and goals of care discussion as needed. Patient's chart reviewed today 10/28/20. Discussed case with Dr. Isaac Millan. Called pt's brother Mckayla Peña and gave him an update on pt's status. Mckayla Peña is hopeful pt will recover and wants to continue aggressive treatment. The following are the currently established goals/code status, and Symptom management.      Goals of care: Continue aggressive treatment per family's request.    Code status: DNR/DNI (Limited- no to all interventions)    Discharge plan: D/c to [facility] when medically ready    Genoveva Ly MD PGY-3  10/28/20  3:23 PM

## 2020-10-28 NOTE — PROGRESS NOTES
Messaged primary MD for lab order today to reevaluate potassium value as only six of eight bags could be given since yesterday. Multiple non compatible IV meds were being given, which limited the run time of the potassium. Will ask for updated order based on new lab value.

## 2020-10-29 NOTE — PROGRESS NOTES
Attempted to wean pt off NRB, pt o2 sats dropped to 84% without NRB, remains on NRB and vapo at 92% o2

## 2020-10-29 NOTE — PROGRESS NOTES
Patient Name: Uday Ceballos                                                    Primary Physician: Jeanne Greco MD  Admitting Dx: Acute respiratory failure [facility]) [J96.00]  Acute respiratory failure ([address] [J96.00]  Acute respiratory failure with hypoxia [facility]) [J96.01]      Nephrology Hospital Progress Note  [facility] Nephrology  [email or web address] [employer]                                       Interval Plan:     · Na now 144 on D5W + KCL at 75 cc/hr. Not taking adequate fluids. · Renal function stable. · COVID +ve . · Increase nifedipine XL to 60 mg.  · Urine output is 3.2 L. Assessment:     Hypernatremia  · Likely fluid access related now on IVF NS and monitor for improvement in Na. · No need for aggressive workup at this time. · Na is only 148 on this admission and not nearly as high as in the past > 165.     · Previously from poor oral intake in the face of lithium use. Lithium can cause polyuria. Monitor renal daily  · Appears to be of Lithium and now on HCTZ which has been held as well likely contributing. Hx of DEBBY  ·  Baseline creatinine is 0.7 and now 0.9  · Hematuria: resolved  Resp Failure / COVID +ve  · On Vanc, Remdesivir, Deadron, and Cefepime. NO Zosyn / Vanc combination   · Now on 15 L NC  Hypokalemia  · ok at 3.9 and yesterday given 40 meq      Please call our office at [phone] or [employer] with any questions or contact me directly. Subjective:     CC / Reason for Consult:    Hypernatremia    HPI/PMH:.  Uday Ceballos is a 44 y.o. male admitted for Resp Failure / COVID +ve with PMHx of traumatic brain injury with encephalopathy and seizure disorder, schizophrenia/bipolar disorder. Previously seen for hypernatremia as well and not taking in fluids or eating and admitted at [facility] with similar issues with hypernatremia, DEBBY and pneumonia. Previously sodium was 167 and creatinine was 4.8 with BUN of 62. He was also hypotensive and receive IVF. Now Na is 148 and SCr is 0.9. Patient is not responsive and unable to provide history. ROS / Interval Hx: Patient seen in room with no changes. Denies difficulty breathing. Difficult to follow. Objective:     Minimal exam was performed as patient is COVID-19 positive. It is to minimize exposure and preserve PPE. Vitals:     BP (!) 169/91   Pulse 132   Temp 101.2 °F (38.4 °C) (Axillary)   Resp 26   Ht 6' 0.01\" (1.829 m)   Wt 198 lb (89.8 kg)   SpO2 90%   BMI 26.85 kg/m²      I/Os: Reviewed    Meds: Reviewed. Please see plan for changes made.     Labs:    Lab Results   Component Value Date    WBC 15.7 10/29/2020    HGB 10.6 10/29/2020    HCT 32.7 10/29/2020    MCV 87.2 10/29/2020     10/29/2020      Lab Results   Component Value Date    CREATININE 1.0 10/29/2020    BUN 20 10/29/2020     10/29/2020    K 3.5 10/29/2020    K 4.3 10/21/2020     10/29/2020    CO2 26 10/29/2020     No components found for: GLU   Lab Results   Component Value Date    CALCIUM 8.4 10/29/2020    CAION 1.21 04/29/2015    PHOS 3.1 05/16/2015      No results found for: [id number]   Lab Results   Component Value Date    IRON 46 05/01/2015    TIBC 158 05/01/2015    FERRITIN 1,688.0 10/28/2020      No results found for: Alin Denise
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
## 2020-10-29 NOTE — PROGRESS NOTES
Notified family, Jessica INGRAM and brother re pxt's status. They understand he is very ill, with high risk for further deterioration including death from complications of COVID. They express understanding, and state they will \"keep praying\" he gets better and we should not give up on him, as he has \"cheated death several times in the past\". We will continue care as current  Suspend dextrose gtt  Diurese: will give 1 dose lasix 60 mg IV  DC scheduled ativan IV for now  Check Ammonia level, on Valproate  Continue rest of care  Pall med and Pulmonary following. Pxt is limited code: see ACP docs/Pall Med notes.         Cathryn Pinedo MD

## 2020-10-30 PROBLEM — J12.82 PNEUMONIA DUE TO COVID-19 VIRUS: Status: ACTIVE | Noted: 2020-01-01

## 2020-10-30 PROBLEM — U07.1 PNEUMONIA DUE TO COVID-19 VIRUS: Status: ACTIVE | Noted: 2020-01-01
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
## 2020-10-30 NOTE — PROGRESS NOTES
Patient Name: Aline Stein                                                    Primary Physician: Bryan Mccall MD  Admitting Dx: Acute respiratory failure [facility]) [J96.00]  Acute respiratory failure ([address] [J96.00]  Acute respiratory failure with hypoxia [facility]) [J96.01]      Nephrology Hospital Progress Note  [facility] Nephrology  [email or web address]                                       Interval Plan:     · Na now 145 . Not taking adequate fluids. · Worsening creatinine is up to 1.6 after given Lasix  · COVID +ve . · Continue nifedipine XL to 60 mg.  · Urine output is 3.2 L. · Hold any further diuresis. Assessment:     Hypernatremia  · Likely fluid access related now on IVF NS and monitor for improvement in Na. · No need for aggressive workup at this time. · Na is only 148 on this admission and not nearly as high as in the past > 165.     · Previously from poor oral intake in the face of lithium use. Lithium can cause polyuria. Monitor renal daily  · Appears to be of Lithium and now on HCTZ which has been held as well likely contributing. Hx of DEBBY  ·  Baseline creatinine is 0.7 and now 0.9  · Hematuria: resolved  Resp Failure / COVID +ve  · On Vanc, Remdesivir, Deadron, and Cefepime. NO Zosyn / Vanc combination   · Now on 15 L NC  Hypokalemia  · ok at 3.9 and yesterday given 40 meq      Please call our office at [phone] or [employer] with any questions or contact me directly. Subjective:     CC / Reason for Consult:    Hypernatremia    HPI/PMH:.  Aline Stein is a 44 y.o. male admitted for Resp Failure / COVID +ve with PMHx of traumatic brain injury with encephalopathy and seizure disorder, schizophrenia/bipolar disorder. Previously seen for hypernatremia as well and not taking in fluids or eating and admitted at [facility] with similar issues with hypernatremia, DEBBY and pneumonia. Previously sodium was 167 and creatinine was 4.8 with BUN of 62. He was also hypotensive and receive IVF. Now Na is 148 and SCr is 0.9. Patient is not responsive and unable to provide history. ROS / Interval Hx: Patient seen in room with no changes. Denies difficulty breathing. Difficult to follow. Objective:     Minimal exam was performed as patient is COVID-19 positive. It is to minimize exposure and preserve PPE. Vitals:     BP (!) 155/75   Pulse 102   Temp 101.7 °F (38.7 °C) (Axillary)   Resp (!) 46   Ht 6' 0.01\" (1.829 m)   Wt 198 lb (89.8 kg)   SpO2 92%   BMI 26.85 kg/m²      I/Os: Reviewed    Meds: Reviewed. Please see plan for changes made.     Labs:    Lab Results   Component Value Date    WBC 18.0 10/30/2020    HGB 10.4 10/30/2020    HCT 33.0 10/30/2020    MCV 88.4 10/30/2020     10/30/2020      Lab Results   Component Value Date    CREATININE 1.6 10/30/2020    BUN 32 10/30/2020     10/30/2020    K 3.7 10/30/2020    K 4.3 10/21/2020     10/30/2020    CO2 26 10/30/2020     No components found for: GLU   Lab Results   Component Value Date    CALCIUM 8.5 10/30/2020    CAION 1.21 04/29/2015    PHOS 3.1 05/16/2015      No results found for: [id number]   Lab Results   Component Value Date    IRON 46 05/01/2015    TIBC 158 05/01/2015    FERRITIN 1,688.0 10/28/2020      No results found for: Yelitza Rivera

## 2020-10-30 NOTE — PROGRESS NOTES
Patient found in room with secretions in Bipap. Suctioned secretions and notified respiratory to discontinue Bipap. Pt put back on Vapotherm and NRB.

## 2020-10-30 NOTE — PROGRESS NOTES
Speech Language Pathology - Hold  DISCHARGE    Chart reviewed and spoke with RN. Pt not alert, on 40L vapotherm + 15L non-rebreather. Pt not appropriate for swallow tx at this time. Recommend continue with oral care with suction. Will discharge from [address] [place] [address]  at this time. Please re-refer when pt appropriate for re-assessment. Page ST department with any concerns.     Electronically Signed by:  Sunil Cooper., [id number] [facility]  Speech-Language Pathologist  [place]. [phone]  Pager #[phone]

## 2020-10-30 NOTE — CARE COORDINATION
Case Management Assessment           Daily Note                 Date/ Time of Note: 10/30/2020 12:29 PM         Note completed by: Frances Smith    Patient Name: George Majano  YOB: 1970    Diagnosis:Acute respiratory failure ([address] [J96.00]  Acute respiratory failure ([facility] [address] [J96.00]  Acute respiratory failure with hypoxia ([facility] [address] [J96.01]  Patient Admission Status: Inpatient    Date of Admission:10/21/2020  1:57 AM Length of Stay: 9 GLOS:      Current Plan of Care: 40L vapotherm + 15L non-rebreather  ________________________________________________________________________________________  PT AM-PAC:   / 24 per last evaluation on:     OT AM-PAC:   / 24 per last evaluation on:     DME Needs for discharge: defer  ________________________________________________________________________________________  Discharge Plan: [address] [place] [address] ([facility]): [facility]    Tentative discharge date: TBD     Current barriers to discharge: medical clearance    Referrals completed: LTACH: [facility]      ________________________________________________________________________________________  Case Management Notes:  CM sent referral to [address] [place] [address], who will follow and look at his clinicals again on Monday, if pt is able to wean down to stable level of oxygen, long term acute care may be a good option for him. Patient is very ill at this time and family does not want hospice. Anna Allen and his family were provided with choice of provider; he and his family are in agreement with the discharge plan.     Care Transition Patient: Leah Smith RN  [facility]  Case Management Department  Ph: [phone]  Fax: [phone]

## 2020-10-30 NOTE — PROGRESS NOTES
Pt's heart rate consistently in 130s, with a spike to 150. Notified primary physician, obtained orders for prn lebetalol.

## 2020-10-30 NOTE — PROGRESS NOTES
Pt's oxygen is 82 percent on Vapotherm and non rebreather. Notified respiratory team and perfect served MD. Pt to be put on Bipap.
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
## 2020-10-30 NOTE — PROGRESS NOTES
Pt's temperature axillary is 103.3. Will administer rectal tylenol (attempting to insert around the rectal tube). Placed ice packs under patient's arms and behind his neck.

## 2020-10-30 NOTE — PROGRESS NOTES
Palliative Care Chart Review  and Check in Note:     NAME:  Lester Thomas  Admit Date: 10/21/2020  Hospital Day:  Hospital Day: 10   Current Code status: Limited    Palliative care is continuing to following Mr. Hurtado for symptom management,  and goals of care discussion as needed. Patient's chart reviewed today 10/30/20. Called brother Hui Gale and gave him an update on pt's status. Discussed artificial nutrition/NG tube feeding vs comfort care and feeding him for comfort. He wants to discuss it with his wife. The following are the currently established goals/code status, and Symptom management. Goals of care: Family wants to continue aggressive care in hopes that pt will recover.     Code status: DNR/DNI (Limited- no to all interventions)    Discharge plan: Return to [facility] when medically ready    Kandi Hayward MD PGY-3  10/30/20  4:22 PM

## 2020-10-30 NOTE — PLAN OF CARE
Problem: Skin Integrity:  Goal: Will show no infection signs and symptoms  Description: Will show no infection signs and symptoms  Outcome: Pt has documented wounds. He is on special mattress and is being repositioned per policy. No new skin breakdown noted. Problem: Falls - Risk of:  Goal: Will remain free from falls  Description: Will remain free from falls  Outcome: Met this shift     Problem: Airway Clearance - Ineffective  Goal: Achieve or maintain patent airway  Outcome: Met this shift  Pt requires frequent suctioning due to thick secretions. Will continue to monitor and suction as needed. Problem: Gas Exchange - Impaired  Goal: Absence of hypoxia  Outcome: Not Met this shift  Patient continues on very high oxygen requirements, with decreasing saturation MD is aware, respiratory on board, family is aware. Problem: Body Temperature -  Risk of, Imbalanced  Goal: Ability to maintain a body temperature within defined limits  Outcome: Not Met This Shift  Pt has run a fever entire shift. Rectal tylenol given, ice packs applied, patients head and faced wiped with cool towels. Pt continues to run fever. Problem: Nutrition Deficits  Goal: Optimize nutrtional status  Outcome: Met this shift. Pt is npo. Blood sugars are being taken per order.

## 2020-10-30 NOTE — PLAN OF CARE
Problem: Nutrition  Goal: Optimal nutrition therapy  Outcome: Ongoing  Note: Nutrition Problem #1: Inadequate oral intake  Intervention: Food and/or Nutrient Delivery: Continue NPO(start EN vs PN)  Nutritional Goals: Pt will tolerate the most appropriate form of nutrition therapy this admission

## 2020-10-30 NOTE — PROGRESS NOTES
Pulmonology PGY-1 Resident Progress Note        PCP: Owen Paul MD    Date of Admission: 10/21/2020    Chief Complaint/Consult: Hypoxia and fever    Subjective:   Yesterday, patient desatted to 82% on 40L Vapotherm at 100% FiO2 and 15 L nonrebreather and patient was transitioned to BiPAP. Patient was found with secretions and BiPAP was discontinued. Additionally, patient febrile to 103.9 yesterday. Patient seen at the bedside this a.m. He is more lethargic. Could not get a ROS from him. Medications:  Reviewed    Infusion Medications    dextrose       Scheduled Medications    metoprolol  5 mg Intravenous Q6H    fosphenytoin (CEREBYX) maintenance dose IVPB  100 mg PE Intravenous Q6H    valproate sodium (DEPACON) IVPB  625 mg Intravenous Q6H    dexamethasone  6 mg Intravenous Q24H    enoxaparin  30 mg Subcutaneous BID    Venelex   Topical BID    sodium chloride flush  10 mL Intravenous 2 times per day    insulin lispro  0-12 Units Subcutaneous Q4H     PRN Meds: LORazepam, sodium chloride, prochlorperazine, labetalol, sodium chloride flush, acetaminophen **OR** acetaminophen, polyethylene glycol, promethazine **OR** [DISCONTINUED] ondansetron, glucose, dextrose, glucagon (rDNA), dextrose      Intake/Output Summary (Last 24 hours) at 10/30/2020 1019  Last data filed at 10/30/2020 0336  Gross per 24 hour   Intake 227.71 ml   Output 2900 ml   Net -2672.29 ml       Physical Exam Performed:    /66   Pulse 101   Temp 100.1 °F (37.8 °C) (Axillary)   Resp (!) 40   Ht 6' 0.01\" (1.829 m)   Wt 198 lb (89.8 kg)   SpO2 91%   BMI 26.85 kg/m²     General appearance: Mild distress, appears stated age. On Vapotherm 40L at 100% FiO2 and 15L NRB. HEENT: Pupils equal, round, and reactive to light. Conjunctivae/corneas clear. Neck: Supple, with full range of motion. No jugular venous distention. Trachea midline. Respiratory: Mildly increased respiratory effort. Jacklynn Roys again.  Crackles appreciated bilaterally in the upper and lower chest, more in the upper left without wheezes. Cardiovascular: Regular rate and rhythm with normal S1/S2 without murmurs, rubs or gallops. Abdomen: Soft, non-tender, non-distended with normal bowel sounds. Musculoskeletal: No clubbing, cyanosis or edema bilaterally. Full range of motion without deformity. Trace pitting edema in the lower extremities bilaterally  Skin: Skin color, texture, turgor normal.  No rashes or lesions. Neurologic: Unable to assess secondary to patient's lethargy  Psychiatric: Unable to assess secondary to patient's lethargy  Capillary Refill: Brisk,< 3 seconds   Peripheral Pulses: +2 palpable, equal bilaterally       Labs:   Recent Labs     10/28/20  1100 10/29/20  0625 10/30/20  0510   WBC 14.2* 15.7* 18.0*   HGB 10.2* 10.6* 10.4*   HCT 32.2* 32.7* 33.0*    432 395     Recent Labs     10/28/20  1100 10/29/20  0625 10/30/20  0510    144 145   K 3.8 3.5 3.7    110 107   CO2 24 26 26   BUN 20 20 32*   CREATININE 0.9 1.0 1.6*   CALCIUM 8.2* 8.4 8.5     No results for input(s): AST, ALT, BILIDIR, BILITOT, ALKPHOS in the last 72 hours. No results for input(s): INR in the last 72 hours. No results for input(s): Bebeto Beery in the last 72 hours. Urinalysis:      Lab Results   Component Value Date    NITRU Negative 10/21/2020    WBCUA 3-5 10/21/2020    BACTERIA Rare 10/21/2020    RBCUA 21-50 10/21/2020    BLOODU LARGE 10/21/2020    SPECGRAV 1.025 10/21/2020    GLUCOSEU Negative 10/21/2020       Radiology:  XR CHEST PORTABLE   Final Result      Progressive perihilar pulmonary edema superimposed on coarse bilateral upper lobe infiltrates. Low lung volumes. Top normal heart size. XR CHEST 1 VIEW   Final Result   Marked interval progression of airspace disease as detailed above likely reflecting worsening pneumonia. XR CHEST PORTABLE   Final Result   1. Low lung volumes.    2.  Patchy opacities in the upper perihilar regions and scattered in the    right midlung zone and left lung base. May represent    infectious/inflammatory or neoplastic process. Recommend follow-   up/further workup. 3.  Left pleural effusion or thickening, especially along the    superolateral aspect. Assessment/Plan:    Jacki Gotti, 44 y.o. male w/ TBI, Schizophrenia, Bipolar disorder and seizures who presented with Fever and hypoxia from his care facility. Admitted for Acute hypoxic respiratory failure. Suspicion for COVID 19 PNA as etiology. Active Hospital Problems    Diagnosis Date Noted    Acute respiratory failure ([address] [J96.00] 10/21/2020    Shortness of breath [R06.02]     Advance care planning [Z71.89]     Acute respiratory failure with hypoxia ([address] [J96.01] 04/19/2015       Acute hypoxic respiratory failure 2/2 COVID-19 PNA  CXR and CT from 2017 clear, therefore unlikely chronic etiology. CXR is now more typical for COVID-19 with marked interval progression of airspace disease which is now more diffuse and widespread. - Now back on Vapotherm 40L at 100% FiO2 with NRB 15L and satting low 90s.  - O2 goal of >88%  - Completed 7-day course of Vanc and cefepime yesterday  - Completed 5-day course of remdesevir  - Continue Decadron (day 9)  - D-dimer up to 746 from 453 . Refugia Helms maintain current course. Pt limited code. - Will continue to monitor closely    COVID positive      DVT Prophylaxis: Lovenox  Diet: Diet NPO Effective Now  Code Status: Limited  PT/OT Eval Status: Not consulted  Dispo - [facility]. > 2-day hospital stay    Discussed the patient with Dr Elizabeth Chan MD  Internal Medicine Resident, PGY-1  Perfect Serve  Patient examined, findings as discussed with Dr Saeed Arthur. Agree with assessment and plan as above. Worsening pneumonia due to COVID, with severe hypoxemia. Prognosis continues to be very poor. He will not improve with forced nutritional support.

## 2020-10-31 NOTE — DISCHARGE SUMMARY
Hospital Medicine Discharge Summary  Death Summary    Patient ID: Karey Meng      Patient's PCP: Cheryl Golden MD    Admit Date: 10/21/2020     Date of Death 10/31/2020  Time of Death: 6: 44 AM        Hospital Course:   44 y. o. male  With pmhx of TBI, Seizures, dementia, aphasia , schizophrenia who presented form nursing home  today with worsening SOB, and altered mental stattus. Multiple residents positive for covid 19 at his facility     ED workup with fever. B/ l inifltrates, DEBBY, elevated lactate      Was admitted and managed for Acute respiratory failure w/ hypoxia secondary to COVID PNA. Was initially treated with vancomycin, cefepime x7d for possible bacterial pneumonia component. Received remdesivir x5d and Decadron, and was seen abnd followed by Pulmonary/critical care, as well as by nephrology, for DEBBY. Due to progressive decline, was seen by Palliative care for goals of care which were clarified with family, to be limited code. Pxt progressively declined with progressively worsening breathing, hypoxia and imaging. Family notified and came to see him, and elected for comfort measures only.  He eventually, passed on 10/31/2020 at  11: 44 AM.            Cause of death:   # Acute respiratory failure w/ hypoxia  # COVID Pneumonia    Associated conditions:   #DEBBY  # Schizophrenia, dementia          Cecile Ivey MD   10/31/2020

## 2020-10-31 NOTE — PROGRESS NOTES
Patient passed. Family recently left. Called patient's sister William Reyse [place] [address] and notified them of death. Called Dr. Kennedi Tejeda to bedside, death was confirmed.  Will complete death post mortem care list.

## 2020-10-31 NOTE — PROGRESS NOTES
Pt's SpO2 is 86% with RR of 60 on Vapotherm 40L and 100% with 15L non-rebreathing mask. Pt rhonchorous throughout. Nt suctioned bright red. Nurse aware.

## 2020-10-31 NOTE — PROGRESS NOTES
Resp rate 60, 86% on 40L vapotherm and 100% non rebreather mask. Temp 101.5. Large amount of blood coming from mouth. Suctioned, no improvement with o2 sat. Paged Dr. Bender Do to ask him to speak with family regarding end of life. Charge nurse Rosa Elena aware, and is asking  if family can come in.  Antwon Hernández RN

## 2020-10-31 NOTE — PLAN OF CARE
Problem: Skin Integrity:  Goal: Will show no infection signs and symptoms  Description: Will show no infection signs and symptoms  10/30/2020 2000 by Armen Velasco RN  Outcome: Ongoing  Note: Rah skin assessment completed. Venelex admin per order. Repositioned every 2 hours. Prevelon boots in place. Mepilex borders assessed this shift. Will continue to monitor for skin integrity impairment. Armen Velasco    10/30/2020 1942 by Armen Velasco RN  Outcome: Ongoing  Goal: Absence of new skin breakdown  Description: Absence of new skin breakdown  10/30/2020 2000 by Armen Velasco RN  Outcome: Ongoing  10/30/2020 1942 by Armen Velasco RN  Outcome: Ongoing     Problem: Falls - Risk of:  Goal: Will remain free from falls  Description: Will remain free from falls  10/30/2020 2000 by Armen Velasco RN  Outcome: Ongoing  Note: Free from falls at this time. All precautions remain in place. Dome light illuminated for fall risk. Call light and assistive devices within reach. Will monitor.  Armen Velasco    10/30/2020 1942 by Armen Velasco RN  Outcome: Ongoing  Goal: Absence of physical injury  Description: Absence of physical injury  10/30/2020 2000 by Armen Velasco RN  Outcome: Ongoing  10/30/2020 1942 by Armen Velasco RN  Outcome: Ongoing     Problem: Airway Clearance - Ineffective  Goal: Achieve or maintain patent airway  10/30/2020 2000 by Armen Velasco RN  Outcome: Ongoing  10/30/2020 1942 by Armen Velasco RN  Outcome: Ongoing     Problem: Gas Exchange - Impaired  Goal: Absence of hypoxia  10/30/2020 2000 by Armen Velasco RN  Outcome: Ongoing  10/30/2020 1942 by Armen Velasco RN  Outcome: Ongoing  Goal: Promote optimal lung function  10/30/2020 2000 by Armen Velasco RN  Outcome: Ongoing  10/30/2020 1942 by Armen Velasco RN  Outcome: Ongoing     Problem: Breathing Pattern - Ineffective  Goal: Ability to achieve and maintain a regular respiratory rate  10/30/2020 2000 by Armen Velasco RN  Outcome: Ongoing  10/30/2020 1942 by Tom Martinez RN  Outcome: Ongoing     Problem: Body Temperature -  Risk of, Imbalanced  Goal: Ability to maintain a body temperature within defined limits  10/30/2020 2000 by Tom Martinez RN  Outcome: Ongoing  10/30/2020 1942 by Tom Martinez RN  Outcome: Ongoing  Goal: Will regain or maintain usual level of consciousness  10/30/2020 2000 by Tom Martinez RN  Outcome: Ongoing  10/30/2020 1942 by Tom Martinez RN  Outcome: Ongoing  Goal: Complications related to the disease process, condition or treatment will be avoided or minimized  10/30/2020 2000 by Tom Martinez RN  Outcome: Ongoing  10/30/2020 1942 by Tom Martinez RN  Outcome: Ongoing     Problem: Isolation Precautions - Risk of Spread of Infection  Goal: Prevent transmission of infection  10/30/2020 2000 by Tom Martinez RN  Outcome: Ongoing  Note: High touch areas cleaned with bleach every two hours per protocol. Proper PPE in use and proper hand hygiene.  Tom Martinez    10/30/2020 1942 by Tom Martinez RN  Outcome: Ongoing     Problem: Nutrition Deficits  Goal: Optimize nutrtional status  10/30/2020 2000 by Tom Martinez RN  Outcome: Ongoing  10/30/2020 1942 by Tom Martinez RN  Outcome: Ongoing     Problem: Risk for Fluid Volume Deficit  Goal: Maintain normal heart rhythm  10/30/2020 2000 by Tom Martinez RN  Outcome: Ongoing  10/30/2020 1942 by Tom Martinez RN  Outcome: Ongoing  Goal: Maintain absence of muscle cramping  10/30/2020 2000 by Tom Martinez RN  Outcome: Ongoing  10/30/2020 1942 by Tom Martinez RN  Outcome: Ongoing  Goal: Maintain normal serum potassium, sodium, calcium, phosphorus, and pH  10/30/2020 2000 by Tom Martinez RN  Outcome: Ongoing  10/30/2020 1942 by Tom Martinez RN  Outcome: Ongoing     Problem: Loneliness or Risk for Loneliness  Goal: Demonstrate positive use of time alone when socialization is not possible  10/30/2020 2000 by Tom Martinez RN  Outcome: Ongoing  10/30/2020

## 2020-10-31 NOTE — PLAN OF CARE
Problem: Skin Integrity:  Goal: Absence of new skin breakdown  Description: Absence of new skin breakdown  10/31/2020 0352 by Brian Rice RN  Outcome: Ongoing   No new signs of skin breakdown noted. Venelex cream applied to buttocks, barrier to groin and inner thighs. Patient repositioned q 2 hours with pillow support. Problem: Falls - Risk of:  Goal: Will remain free from falls  Description: Will remain free from falls  10/31/2020 0352 by Brian Rice RN  Outcome: Ongoing   Fall precautions in place. Bed alarm activated, low position, wheels locked. Bedrest.  Patient unable to utilize call light. [employer] monitor in room with 24 hour monitoring per MW. Continue to monitor safety. Problem: Gas Exchange - Impaired  Goal: Absence of hypoxia  10/31/2020 0352 by Brian Rice RN  Outcome: Ongoing   SpO2 87-92% throughout night. Lungs with rhonchi and crackles. Will monitor. Problem: Breathing Pattern - Ineffective  Goal: Ability to achieve and maintain a regular respiratory rate  10/31/2020 0352 by Brian Rice RN  Outcome: Ongoing   RR 30-40 overnight. Patient received a dose of ativan, RR improved to 26. Will monitor. Problem: Isolation Precautions - Risk of Spread of Infection  Goal: Prevent transmission of infection  10/31/2020 0352 by Brian Rice RN  Outcome: Ongoing   COVID +. Utilizing proper PPE when entering room.

## 2020-10-31 NOTE — PROGRESS NOTES
Family at bedside. Patient working hard to breath, family agrees with plan to keep patient comfortable. Medicated with 4mg of morphine iv push.  Nacho Morfin RN

## 2020-10-31 NOTE — PROGRESS NOTES
Dr. Christophe Velasco spoke with family. Mira Al,  approved for family to come in. Family on their way. Patient's o2 sats dropping, currently 73%.  Delia Melo

## 2020-10-31 NOTE — PROGRESS NOTES
Hospitalist Progress Note      PCP: Leigha Stern MD    Date of Admission: 10/21/2020    Chief Complaint: SOB      Subjective:  44 y.o. male  With pmhx of TBI, Seizures, dementia, aphasia , schizophrenia who presented form nursing home  today with worsening SOB, and altered mental stattus. Multiple residents positive for covid 19 at his facility     ED workup with fever. B/ l inifltrates, DEBBY, elevated lactate          Patient seen and examined at the bedside. Remains with high FiO2 reqt, with very low sats  Some bleeding from the mouth  Not responsive to voice        PFHS: reviewed as documented 10/21/2020, no changes        Medications:  Reviewed    Infusion Medications    sodium chloride 100 mL/hr at 10/31/20 6965    dextrose       Scheduled Medications    metoprolol  5 mg Intravenous Q6H    fosphenytoin (CEREBYX) maintenance dose IVPB  100 mg PE Intravenous Q6H    valproate sodium (DEPACON) IVPB  625 mg Intravenous Q6H    enoxaparin  30 mg Subcutaneous BID    Venelex   Topical BID    sodium chloride flush  10 mL Intravenous 2 times per day    insulin lispro  0-12 Units Subcutaneous Q4H     PRN Meds: morphine, LORazepam, sodium chloride, prochlorperazine, labetalol, sodium chloride flush, acetaminophen **OR** acetaminophen, polyethylene glycol, promethazine **OR** [DISCONTINUED] ondansetron, glucose, dextrose, glucagon (rDNA), dextrose      Intake/Output Summary (Last 24 hours) at 10/31/2020 1037  Last data filed at 10/31/2020 0611  Gross per 24 hour   Intake 577 ml   Output 1050 ml   Net -473 ml         Physical Exam Performed:    /74   Pulse 130   Temp 101.5 °F (38.6 °C)   Resp (!) 60   Ht 6' 0.01\" (1.829 m)   Wt 198 lb (89.8 kg)   SpO2 (!) 86%   BMI 26.85 kg/m²     General appearance:  Some blood stain on lips, somnolent  Respiratory:  Diminished breath sounds. . No wheeze. Cardiovascular: S1, S2; Tachycardic.  No murmur  Abdomen: Soft, non-tender, non-distended, no hepatosplenomegaly  Neuro: unresponsive. No gross focal deficits. Labs:   Recent Labs     10/29/20  0625 10/30/20  0510 10/31/20  0556   WBC 15.7* 18.0* 19.1*   HGB 10.6* 10.4* 10.2*   HCT 32.7* 33.0* 32.2*    395 448     Recent Labs     10/29/20  0625 10/30/20  0510 10/31/20  0556    145 148*   K 3.5 3.7 3.6    107 108   CO2 26 26 23   BUN 20 32* 68*   CREATININE 1.0 1.6* 2.8*   CALCIUM 8.4 8.5 8.5     No results for input(s): AST, ALT, BILIDIR, BILITOT, ALKPHOS in the last 72 hours. No results for input(s): INR in the last 72 hours. No results for input(s): [facility] in the last 72 hours. Urinalysis:      Lab Results   Component Value Date    NITRU Negative 10/21/2020    WBCUA 3-5 10/21/2020    BACTERIA Rare 10/21/2020    RBCUA 21-50 10/21/2020    BLOODU LARGE 10/21/2020    SPECGRAV 1.025 10/21/2020    GLUCOSEU Negative 10/21/2020       Radiology:  XR CHEST PORTABLE   Final Result      Progressive perihilar pulmonary edema superimposed on coarse bilateral upper lobe infiltrates. Low lung volumes. Top normal heart size. XR CHEST 1 VIEW   Final Result   Marked interval progression of airspace disease as detailed above likely reflecting worsening pneumonia. XR CHEST PORTABLE   Final Result   1. Low lung volumes. 2.  Patchy opacities in the upper perihilar regions and scattered in the    right midlung zone and left lung base. May represent    infectious/inflammatory or neoplastic process. Recommend follow-   up/further workup. 3.  Left pleural effusion or thickening, especially along the    superolateral aspect.               Assessment/Plan:    Active Hospital Problems    Diagnosis Date Noted    Pneumonia due to COVID-19 virus [U07.1, J12.89] 10/30/2020    Acute respiratory failure (HCC) [J96.00] 10/21/2020    Shortness of breath [R06.02]     Advance care planning [Z71.89]     Acute respiratory failure with hypoxia ([address] [J96.01] 04/19/2015    Traumatic brain injury [facility]) [S06.9X9A] 04/19/2015       Plan:    # Acute respiratory failure w/ hypoxia  # COVID PNA  -Pulm following  -s/p vancomycin, cefepime x7d  -s/p remdesivir x5d  -Decadron  -Trialed on Lasix with CXR report- No signif improvement, and renal function worsened. - Pxt severely ill, still running fevers and with progressively worsening resp failure. - Discussed with Pulm and Nephro: picture consistent with severe COVID PNA with limited options  - Pulmonary following.   - Limited Code  - Family notified that pxt likely is passing          #. DEBBY  Cr worsened       # Schizophrenia, dementia      DVT Prophylaxis: Lovenox  Diet: Diet NPO Effective Now Pall med to clarify goals of care. Code Status: Limited      PT/OT Eval Status: ongoing      Disposition -  Pxt is very ill with high risk of imminent death related to severe COVID infection. Prognosis appears poor related to COVID  Family aware: will come see him as understand he is actively passing.          Liset Machuca MD

## 2020-10-31 NOTE — PROGRESS NOTES
Spoke with family, Danilo Hendrix and Otis Adan, providing updates on status and plan of care. All questions answered.

## 2020-11-03 LAB — CULTURE, BLOOD 2: NORMAL
